# Patient Record
Sex: FEMALE | Race: WHITE | Employment: FULL TIME | URBAN - METROPOLITAN AREA
[De-identification: names, ages, dates, MRNs, and addresses within clinical notes are randomized per-mention and may not be internally consistent; named-entity substitution may affect disease eponyms.]

---

## 2017-03-22 ENCOUNTER — HOSPITAL ENCOUNTER (OUTPATIENT)
Age: 42
Discharge: HOME | End: 2017-03-22
Attending: EMERGENCY MEDICINE
Payer: COMMERCIAL

## 2017-03-22 VITALS
HEART RATE: 74 BPM | SYSTOLIC BLOOD PRESSURE: 143 MMHG | OXYGEN SATURATION: 100 % | WEIGHT: 135 LBS | TEMPERATURE: 98 F | BODY MASS INDEX: 22 KG/M2 | RESPIRATION RATE: 18 BRPM | DIASTOLIC BLOOD PRESSURE: 94 MMHG

## 2017-03-22 DIAGNOSIS — H92.01 OTALGIA, RIGHT: Primary | ICD-10-CM

## 2017-03-22 PROCEDURE — 99213 OFFICE O/P EST LOW 20 MIN: CPT

## 2017-03-22 PROCEDURE — 99214 OFFICE O/P EST MOD 30 MIN: CPT

## 2017-03-22 RX ORDER — AMOXICILLIN 500 MG/1
500 TABLET, FILM COATED ORAL 3 TIMES DAILY
Qty: 15 TABLET | Refills: 0 | Status: SHIPPED | OUTPATIENT
Start: 2017-03-22 | End: 2017-03-27

## 2017-03-22 NOTE — ED PROVIDER NOTES
Patient Seen in: [facility] In [address] [place] [address]    History   Patient presents with:  Ear Problem Pain (neurosensory)    Stated Complaint: rt ear pain    HPI    82-year-old female presents for evaluation of right ear pain.   Patient reports f OR,  1 QD   Fexofenadine-Pseudoephed ER (ALLEGRA-D 12 HOUR)  MG Oral Tablet 12 Hr,  Take 1 tablet by mouth 2 (two) times daily.        Family History   Problem Relation Age of Onset   • Allergies Neg    • Lipids Paternal Grandmother      hyperlipidemi Effort normal and breath sounds normal. No respiratory distress. Abdominal: Bowel sounds are normal.   Musculoskeletal: Normal range of motion. Neurological: She is alert and oriented to person, place, and time.    No focal deficit   Skin: Skin is warm

## 2018-10-13 ENCOUNTER — HOSPITAL ENCOUNTER (OUTPATIENT)
Dept: MAMMOGRAPHY | Facility: HOSPITAL | Age: 43
Discharge: HOME | End: 2018-10-13
Attending: OBSTETRICS & GYNECOLOGY
Payer: COMMERCIAL

## 2018-10-13 DIAGNOSIS — Z12.31 ENCOUNTER FOR SCREENING MAMMOGRAM FOR MALIGNANT NEOPLASM OF BREAST: ICD-10-CM

## 2018-10-13 PROCEDURE — 77063 BREAST TOMOSYNTHESIS BI: CPT | Performed by: OBSTETRICS & GYNECOLOGY

## 2018-10-13 PROCEDURE — 77067 SCR MAMMO BI INCL CAD: CPT | Performed by: OBSTETRICS & GYNECOLOGY

## 2018-11-15 ENCOUNTER — HOSPITAL ENCOUNTER (OUTPATIENT)
Dept: ULTRASOUND IMAGING | Facility: HOSPITAL | Age: 43
Discharge: HOME | End: 2018-11-15
Attending: OBSTETRICS & GYNECOLOGY
Payer: COMMERCIAL

## 2018-11-15 ENCOUNTER — HOSPITAL ENCOUNTER (OUTPATIENT)
Dept: MAMMOGRAPHY | Facility: HOSPITAL | Age: 43
Discharge: HOME | End: 2018-11-15
Attending: OBSTETRICS & GYNECOLOGY
Payer: COMMERCIAL

## 2018-11-15 DIAGNOSIS — R92.8 ABNORMAL MAMMOGRAM: ICD-10-CM

## 2018-11-15 PROCEDURE — 77065 DX MAMMO INCL CAD UNI: CPT | Performed by: OBSTETRICS & GYNECOLOGY

## 2018-11-15 PROCEDURE — 77061 BREAST TOMOSYNTHESIS UNI: CPT | Performed by: OBSTETRICS & GYNECOLOGY

## 2018-11-15 PROCEDURE — 76642 ULTRASOUND BREAST LIMITED: CPT | Performed by: OBSTETRICS & GYNECOLOGY

## 2018-11-16 ENCOUNTER — TELEPHONE (OUTPATIENT)
Dept: HEMATOLOGY/ONCOLOGY | Facility: HOSPITAL | Age: 43
End: 2018-11-16

## 2018-12-06 ENCOUNTER — TELEPHONE (OUTPATIENT)
Dept: HEMATOLOGY/ONCOLOGY | Facility: HOSPITAL | Age: 43
End: 2018-12-06

## 2018-12-06 NOTE — TELEPHONE ENCOUNTER
Called pt to schedule genetic consult, Anne David folder in basket in the call center.  ASKED TO CALL BACK THIS WILL BE THE LAST ATTEMPT TO REACH HER

## 2019-02-27 ENCOUNTER — TELEPHONE (OUTPATIENT)
Dept: HEMATOLOGY/ONCOLOGY | Facility: HOSPITAL | Age: 44
End: 2019-02-27

## 2019-02-27 NOTE — TELEPHONE ENCOUNTER
We have made several attempts to reach patient to make appt with genetic counselor Vishal Baker since 11/16/18. We will no longer be reaching out to the patient.  brandon

## 2020-02-10 ENCOUNTER — OFFICE VISIT (OUTPATIENT)
Dept: FAMILY MEDICINE CLINIC | Facility: CLINIC | Age: 45
End: 2020-02-10
Payer: COMMERCIAL

## 2020-02-10 VITALS
OXYGEN SATURATION: 98 % | TEMPERATURE: 98 F | WEIGHT: 140 LBS | HEART RATE: 68 BPM | HEIGHT: 66 IN | DIASTOLIC BLOOD PRESSURE: 78 MMHG | BODY MASS INDEX: 22.5 KG/M2 | SYSTOLIC BLOOD PRESSURE: 124 MMHG | RESPIRATION RATE: 16 BRPM

## 2020-02-10 DIAGNOSIS — H10.9 CONJUNCTIVITIS OF LEFT EYE, UNSPECIFIED CONJUNCTIVITIS TYPE: Primary | ICD-10-CM

## 2020-02-10 PROCEDURE — 99202 OFFICE O/P NEW SF 15 MIN: CPT | Performed by: PHYSICIAN ASSISTANT

## 2020-02-10 RX ORDER — POLYMYXIN B SULFATE AND TRIMETHOPRIM 1; 10000 MG/ML; [USP'U]/ML
1 SOLUTION OPHTHALMIC EVERY 4 HOURS
Qty: 1 BOTTLE | Refills: 0 | Status: SHIPPED | OUTPATIENT
Start: 2020-02-10 | End: 2020-02-17

## 2020-02-10 NOTE — PROGRESS NOTES
CHIEF COMPLAINT:   Patient presents with:  Eye Problem: swelling around left eye, was not able to open eye x 1 dy       HPI:   Caitlyn Cornejo is a 45year old female who presents for complaints of \"pink eye\" and mild swelling around left eye since last Family History   Problem Relation Age of Onset   • Lipids Paternal Grandmother         hyperlipidemia   • Cancer Paternal Grandmother 50        breast   • Breast Cancer Paternal Grandmother 50   • Lung Disorder Mother         sarcoidosis   • Lung Disorde the past 24 hour(s)). ASSESSMENT:   Douglas Gandhi is a 45year old female who presents with Eye Problem (swelling around left eye, was not able to open eye x 1 dy ).  Symptoms are consistent with:    Conjunctivitis of left eye, unspecified conjunctivit

## 2020-03-18 ENCOUNTER — OFFICE VISIT (OUTPATIENT)
Dept: FAMILY MEDICINE CLINIC | Facility: CLINIC | Age: 45
End: 2020-03-18
Payer: COMMERCIAL

## 2020-03-18 VITALS
DIASTOLIC BLOOD PRESSURE: 72 MMHG | TEMPERATURE: 98 F | HEART RATE: 91 BPM | OXYGEN SATURATION: 98 % | HEIGHT: 66 IN | SYSTOLIC BLOOD PRESSURE: 122 MMHG | BODY MASS INDEX: 22.5 KG/M2 | WEIGHT: 140 LBS

## 2020-03-18 DIAGNOSIS — R09.81 HEAD CONGESTION: Primary | ICD-10-CM

## 2020-03-18 PROCEDURE — 99213 OFFICE O/P EST LOW 20 MIN: CPT | Performed by: NURSE PRACTITIONER

## 2020-03-18 NOTE — PROGRESS NOTES
CHIEF COMPLAINT:   Patient presents with:  Nose Problem: runny nose, nose congestion x 4 dys       HPI:   Pushpa Torres is a 39year old female who presents for upper respiratory symptoms for 4 days.  Patient reports post nasal drip, sinus fullness f drink(s) per week      Comment: occasionally    Drug use: No        REVIEW OF SYSTEMS:   GENERAL: fair appetite.  No fever/chills/bodyaches  SKIN: no rashes or abnormal skin lesions  HEENT: See HPI  LUNGS: denies shortness of breath or wheezing, See HPI  CA

## 2020-03-18 NOTE — PATIENT INSTRUCTIONS
Comfort measures:  · Hydrate! (cold or hot based on comfort). Drink lots of water or other non dehydrating liquids to help with illness.    · Hand washing-use hand  or wash hands frequently, cover your cough or sneeze, do not share towels or drinks

## 2020-07-08 ENCOUNTER — HOSPITAL ENCOUNTER (OUTPATIENT)
Dept: MAMMOGRAPHY | Facility: HOSPITAL | Age: 45
Discharge: HOME | End: 2020-07-08
Attending: OBSTETRICS & GYNECOLOGY
Payer: COMMERCIAL

## 2020-07-08 DIAGNOSIS — Z12.31 SCREENING MAMMOGRAM FOR HIGH-RISK PATIENT: ICD-10-CM

## 2020-07-08 PROCEDURE — 77067 SCR MAMMO BI INCL CAD: CPT | Performed by: OBSTETRICS & GYNECOLOGY

## 2020-07-08 PROCEDURE — 77063 BREAST TOMOSYNTHESIS BI: CPT | Performed by: OBSTETRICS & GYNECOLOGY

## 2020-10-22 ENCOUNTER — APPOINTMENT (OUTPATIENT)
Dept: GENERAL RADIOLOGY | Facility: HOSPITAL | Age: 45
End: 2020-10-22
Attending: EMERGENCY MEDICINE
Payer: COMMERCIAL

## 2020-10-22 ENCOUNTER — HOSPITAL ENCOUNTER (EMERGENCY)
Facility: HOSPITAL | Age: 45
Discharge: HOME | End: 2020-10-22
Attending: EMERGENCY MEDICINE
Payer: COMMERCIAL

## 2020-10-22 VITALS
RESPIRATION RATE: 15 BRPM | TEMPERATURE: 98 F | DIASTOLIC BLOOD PRESSURE: 82 MMHG | HEART RATE: 69 BPM | OXYGEN SATURATION: 100 % | BODY MASS INDEX: 23 KG/M2 | SYSTOLIC BLOOD PRESSURE: 143 MMHG | WEIGHT: 140 LBS

## 2020-10-22 DIAGNOSIS — R09.1 PLEURISY: Primary | ICD-10-CM

## 2020-10-22 PROCEDURE — 80048 BASIC METABOLIC PNL TOTAL CA: CPT | Performed by: EMERGENCY MEDICINE

## 2020-10-22 PROCEDURE — 85025 COMPLETE CBC W/AUTO DIFF WBC: CPT | Performed by: EMERGENCY MEDICINE

## 2020-10-22 PROCEDURE — 81025 URINE PREGNANCY TEST: CPT

## 2020-10-22 PROCEDURE — 71045 X-RAY EXAM CHEST 1 VIEW: CPT | Performed by: EMERGENCY MEDICINE

## 2020-10-22 PROCEDURE — 93005 ELECTROCARDIOGRAM TRACING: CPT

## 2020-10-22 PROCEDURE — 84484 ASSAY OF TROPONIN QUANT: CPT | Performed by: EMERGENCY MEDICINE

## 2020-10-22 PROCEDURE — 99284 EMERGENCY DEPT VISIT MOD MDM: CPT

## 2020-10-22 PROCEDURE — 85379 FIBRIN DEGRADATION QUANT: CPT | Performed by: EMERGENCY MEDICINE

## 2020-10-22 PROCEDURE — 93010 ELECTROCARDIOGRAM REPORT: CPT | Performed by: EMERGENCY MEDICINE

## 2020-10-22 PROCEDURE — 36415 COLL VENOUS BLD VENIPUNCTURE: CPT

## 2020-10-22 NOTE — ED PROVIDER NOTES
Patient Seen in: [facility] AND [facility] Emergency Department    History   Patient presents with:  Pain    Stated Complaint: Pain by L Rib Radiates to L shoulder and L Jaw    HPI    39year old female presenting with chest pain. Pain began yesterday .  The araceli • Cancer Paternal Grandmother 50        breast   • Breast Cancer Paternal Grandmother 50   • Lung Disorder Mother         sarcoidosis   • Lung Disorder Maternal Uncle 67        sarcoidosis (cause of deatg)   • Allergies Neg    • Hypertension Neg sensory function, no focal deficits noted.   Psych: Calm, cooperative, nl affect    Differential diagnosis to include Acute coronary syndrome vs. Acute pulmonary process including pneumothorax vs. Dissection vs.  pleurisy vs. PE vs. Pneumonia/effusion vs. G admit  SCORE 7-10:72.7% MACE over next 6 weeks consider early invasive strategy. Patient has a HEART score of 1, plan will be outpt fu. Six AJ, Tio BE, Wild DAN. Chest pain in the emergency room: value of the HEART score.  Neth Heart J. 2008 Nate Dye

## 2020-10-22 NOTE — ED INITIAL ASSESSMENT (HPI)
Patient presents with constant left rib pain radiating to left shoulder x 24 hours  Noted pain to left jaw today. Denies SOB/chest pain. Denies any injury/increase work outs. Pain began after eating yesterday.

## 2020-10-22 NOTE — ED NOTES
PT safe to DC home per MD. Kenney Lazo to dress self. DC teaching done, pt verbalizes understanding. Ambulatory with steady gait to exit.

## 2021-02-01 ENCOUNTER — IMMUNIZATION (OUTPATIENT)
Dept: LAB | Facility: HOSPITAL | Age: 46
End: 2021-02-01
Attending: EMERGENCY MEDICINE
Payer: COMMERCIAL

## 2021-02-01 DIAGNOSIS — Z23 NEED FOR VACCINATION: Primary | ICD-10-CM

## 2021-02-01 PROCEDURE — 0011A SARSCOV2 VAC 100MCG/0.5ML IM: CPT

## 2021-03-01 ENCOUNTER — IMMUNIZATION (OUTPATIENT)
Dept: LAB | Facility: HOSPITAL | Age: 46
End: 2021-03-01
Attending: EMERGENCY MEDICINE
Payer: COMMERCIAL

## 2021-03-01 DIAGNOSIS — Z23 NEED FOR VACCINATION: Primary | ICD-10-CM

## 2021-03-01 PROCEDURE — 0012A SARSCOV2 VAC 100MCG/0.5ML IM: CPT

## 2021-04-04 ENCOUNTER — OFFICE VISIT (OUTPATIENT)
Dept: FAMILY MEDICINE CLINIC | Facility: CLINIC | Age: 46
End: 2021-04-04
Payer: COMMERCIAL

## 2021-04-04 VITALS
OXYGEN SATURATION: 99 % | TEMPERATURE: 97 F | RESPIRATION RATE: 16 BRPM | BODY MASS INDEX: 22.5 KG/M2 | DIASTOLIC BLOOD PRESSURE: 90 MMHG | HEART RATE: 88 BPM | HEIGHT: 66 IN | SYSTOLIC BLOOD PRESSURE: 136 MMHG | WEIGHT: 140 LBS

## 2021-04-04 DIAGNOSIS — J02.9 SORE THROAT: ICD-10-CM

## 2021-04-04 DIAGNOSIS — J30.2 SEASONAL ALLERGIC RHINITIS, UNSPECIFIED TRIGGER: Primary | ICD-10-CM

## 2021-04-04 PROCEDURE — 99213 OFFICE O/P EST LOW 20 MIN: CPT | Performed by: NURSE PRACTITIONER

## 2021-04-04 PROCEDURE — 3008F BODY MASS INDEX DOCD: CPT | Performed by: NURSE PRACTITIONER

## 2021-04-04 PROCEDURE — 3080F DIAST BP >= 90 MM HG: CPT | Performed by: NURSE PRACTITIONER

## 2021-04-04 PROCEDURE — 87880 STREP A ASSAY W/OPTIC: CPT | Performed by: NURSE PRACTITIONER

## 2021-04-04 PROCEDURE — 3075F SYST BP GE 130 - 139MM HG: CPT | Performed by: NURSE PRACTITIONER

## 2021-04-04 NOTE — PROGRESS NOTES
CHIEF COMPLAINT:   Patient presents with:  Sore Throat        HPI:   Pushpa Torres is a 42year old female presents to clinic with complaint of mild sore throat. Patient has had for  days.   Denies chills,  fever,  headache, body aches, loss of tas otherwise, good appetite  SKIN: denies any unusual skin lesions or rashes  HEENT: See HPI. GI: see HPI. EXAM:   /90   Pulse 88   Temp 97.1 °F (36.2 °C)   Resp 16   Ht 5' 6\" (1.676 m)   Wt 140 lb (63.5 kg)   LMP 03/21/2021   SpO2 99%   BMI 22. Deanna YOUNG Follow up in 3 days if not improving, condition worsens  Verbalized understanding of these issues and agrees to the plan.     Patient Instructions     Self-Care for Sore Throats     Sore throats happen for many reasons, such as colds, allergies Prevention tips include:  · Stop smoking or reduce contact with secondhand smoke. Smoke irritates the tender throat lining. · Limit contact with pets and with allergy-causing substances, such as pollen and mold.   · Wash your hands often when you’re around condition called asthma.    Tests can be done to see what allergens are affecting you. You may be referred to an allergy specialist for testing and further evaluation.    Home care  Your healthcare provider may prescribe medicines to help relieve allergy sy your healthcare provider  · Raised red bumps (hives)  · Continuing symptoms, new symptoms, or worsening symptoms  Call 911  Call 911 if you have:   · Trouble breathing  · Severe swelling of the face or severe itching of the eyes or mouth  · Wheezing or jaqueline

## 2021-04-04 NOTE — PATIENT INSTRUCTIONS
Self-Care for Sore Throats     Sore throats happen for many reasons, such as colds, allergies, cigarette smoke, air pollution, and infections caused by viruses or bacteria. In any case, your throat becomes red and sore.  Your goal for self-care is to redu allergy-causing substances, such as pollen and mold. · Wash your hands often when you’re around someone with a sore throat or cold. This will keep viruses or bacteria from spreading. · Limit outdoor time when air pollution is bad.   · Don’t strain your vo evaluation. Home care  Your healthcare provider may prescribe medicines to help relieve allergy symptoms. These may include oral medicines, nasal sprays, or eye drops.    Ask your provider for advice on how to stay away from substances that you are allerg Trouble breathing  · Severe swelling of the face or severe itching of the eyes or mouth  · Wheezing or shortness of breath  · Chest tightness  · Dizziness or lightheadedness  · Feeling of doom  · Stomach pain, bloating, vomiting, or diarrhea  [employer] last
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
## 2021-12-19 ENCOUNTER — HOSPITAL ENCOUNTER (OUTPATIENT)
Dept: MAMMOGRAPHY | Facility: HOSPITAL | Age: 46
Discharge: HOME | End: 2021-12-19
Attending: OBSTETRICS & GYNECOLOGY
Payer: COMMERCIAL

## 2021-12-19 DIAGNOSIS — Z12.31 ENCOUNTER FOR SCREENING MAMMOGRAM FOR MALIGNANT NEOPLASM OF BREAST: ICD-10-CM

## 2021-12-19 PROCEDURE — 77067 SCR MAMMO BI INCL CAD: CPT | Performed by: OBSTETRICS & GYNECOLOGY

## 2021-12-19 PROCEDURE — 77063 BREAST TOMOSYNTHESIS BI: CPT | Performed by: OBSTETRICS & GYNECOLOGY

## 2022-01-16 ENCOUNTER — HOSPITAL ENCOUNTER (OUTPATIENT)
Age: 47
Discharge: HOME | End: 2022-01-16
Payer: COMMERCIAL

## 2022-01-16 VITALS
WEIGHT: 140 LBS | BODY MASS INDEX: 22.5 KG/M2 | OXYGEN SATURATION: 99 % | RESPIRATION RATE: 18 BRPM | SYSTOLIC BLOOD PRESSURE: 156 MMHG | HEIGHT: 66 IN | DIASTOLIC BLOOD PRESSURE: 95 MMHG | HEART RATE: 94 BPM | TEMPERATURE: 99 F

## 2022-01-16 DIAGNOSIS — M79.674 PAIN OF TOE OF RIGHT FOOT: Primary | ICD-10-CM

## 2022-01-16 PROCEDURE — 99213 OFFICE O/P EST LOW 20 MIN: CPT | Performed by: PHYSICIAN ASSISTANT

## 2022-01-16 NOTE — ED PROVIDER NOTES
Patient Seen in: [facility]      History   Patient presents with: Toe Pain    Stated Complaint: FOOT ISSUE , BLEEDING SEEMS INFECTED    Subjective:   HPI    62-year-old female presenting for evaluation of right great toe pain.   Patient state note reviewed. Constitutional:       General: She is not in acute distress. HENT:      Head: Normocephalic and atraumatic. Right Ear: External ear normal.      Left Ear: External ear normal.      Nose: Nose normal.      Mouth/Throat:      Mouth:  Mu

## 2022-01-16 NOTE — ED INITIAL ASSESSMENT (HPI)
Pt had a pedicure yesterday. Pt states her cuticle was cut on the right big toe.  +bleeding and redness yesterday. +pain.

## 2022-01-19 ENCOUNTER — HOSPITAL ENCOUNTER (OUTPATIENT)
Age: 47
Discharge: HOME | End: 2022-01-19
Payer: COMMERCIAL

## 2022-01-19 VITALS
SYSTOLIC BLOOD PRESSURE: 163 MMHG | OXYGEN SATURATION: 99 % | RESPIRATION RATE: 18 BRPM | TEMPERATURE: 99 F | HEART RATE: 79 BPM | DIASTOLIC BLOOD PRESSURE: 102 MMHG

## 2022-01-19 DIAGNOSIS — Z51.89 VISIT FOR WOUND CHECK: Primary | ICD-10-CM

## 2022-01-19 PROCEDURE — 99213 OFFICE O/P EST LOW 20 MIN: CPT | Performed by: EMERGENCY MEDICINE

## 2022-01-19 RX ORDER — CEFADROXIL 500 MG/1
500 CAPSULE ORAL 2 TIMES DAILY
Qty: 14 CAPSULE | Refills: 0 | Status: SHIPPED | OUTPATIENT
Start: 2022-01-22 | End: 2022-01-29

## 2022-01-20 NOTE — ED PROVIDER NOTES
Patient Seen in: [facility]      History   Patient presents with:   Toe Pain    Stated Complaint: recheck on toe    Subjective:   HPI  Teddy Hinton is a 42year old female here  for wound check to right toe after getting a pedicure las (Temporal)   Resp 18   LMP 01/19/2022   SpO2 99%         Physical Exam  Vitals and nursing note reviewed. Constitutional:       Appearance: Normal appearance. HENT:      Head: Normocephalic.       Nose: Nose normal.   Eyes:      Pupils: Pupils are equal for home. Disposition and Plan     Clinical Impression:  Visit for wound check  (primary encounter diagnosis)     Disposition:  Discharge  1/19/2022  8:24 am    Follow-up:  No follow-up provider specified.         Medications Prescribed:  Discharge

## 2022-06-10 ENCOUNTER — HOSPITAL ENCOUNTER (OUTPATIENT)
Age: 47
Discharge: HOME | End: 2022-06-10
Payer: COMMERCIAL

## 2022-06-10 VITALS
BODY MASS INDEX: 21.97 KG/M2 | OXYGEN SATURATION: 100 % | RESPIRATION RATE: 18 BRPM | HEART RATE: 84 BPM | TEMPERATURE: 98 F | SYSTOLIC BLOOD PRESSURE: 141 MMHG | HEIGHT: 67 IN | DIASTOLIC BLOOD PRESSURE: 88 MMHG | WEIGHT: 140 LBS

## 2022-06-10 DIAGNOSIS — S05.02XA ABRASION OF LEFT CORNEA, INITIAL ENCOUNTER: Primary | ICD-10-CM

## 2022-06-10 PROCEDURE — 99213 OFFICE O/P EST LOW 20 MIN: CPT | Performed by: NURSE PRACTITIONER

## 2022-06-10 RX ORDER — TETRACAINE HYDROCHLORIDE 5 MG/ML
2 SOLUTION OPHTHALMIC ONCE
Status: COMPLETED | OUTPATIENT
Start: 2022-06-10 | End: 2022-06-10

## 2022-06-10 RX ORDER — OFLOXACIN 3 MG/ML
2 SOLUTION/ DROPS OPHTHALMIC 4 TIMES DAILY
Qty: 10 ML | Refills: 0 | Status: SHIPPED | OUTPATIENT
Start: 2022-06-10 | End: 2022-06-15

## 2022-06-10 NOTE — ED INITIAL ASSESSMENT (HPI)
Pt here for evaluation of L eye swelling, redness/tearing/irritated and feeling like there is something there. Pt states this started this AM and pt had a lash lift and tint yesterday.

## 2022-06-11 ENCOUNTER — HOSPITAL ENCOUNTER (EMERGENCY)
Facility: HOSPITAL | Age: 47
Discharge: HOME | End: 2022-06-11
Attending: EMERGENCY MEDICINE
Payer: COMMERCIAL

## 2022-06-11 VITALS
TEMPERATURE: 98 F | HEART RATE: 75 BPM | SYSTOLIC BLOOD PRESSURE: 173 MMHG | OXYGEN SATURATION: 100 % | WEIGHT: 140 LBS | DIASTOLIC BLOOD PRESSURE: 86 MMHG | BODY MASS INDEX: 22 KG/M2 | RESPIRATION RATE: 18 BRPM

## 2022-06-11 DIAGNOSIS — H10.13 ALLERGIC CONJUNCTIVITIS OF BOTH EYES: Primary | ICD-10-CM

## 2022-06-11 PROCEDURE — 99283 EMERGENCY DEPT VISIT LOW MDM: CPT

## 2022-06-11 RX ORDER — TETRACAINE HYDROCHLORIDE 5 MG/ML
1 SOLUTION OPHTHALMIC ONCE
Status: COMPLETED | OUTPATIENT
Start: 2022-06-11 | End: 2022-06-11

## 2022-06-11 RX ORDER — OLOPATADINE HYDROCHLORIDE 1 MG/ML
1 SOLUTION/ DROPS OPHTHALMIC 2 TIMES DAILY
Qty: 1 EACH | Refills: 0 | Status: SHIPPED | OUTPATIENT
Start: 2022-06-11 | End: 2022-06-21

## 2022-06-11 RX ORDER — TETRACAINE HYDROCHLORIDE 5 MG/ML
SOLUTION OPHTHALMIC
Status: COMPLETED
Start: 2022-06-11 | End: 2022-06-11

## 2022-06-11 NOTE — ED QUICK NOTES
Patient A&OX4, discharge paperwork, prescription, and follow-up discussed with pt, pt verbally understands them. Pt discharged ambulatory with steady - no distress noted.

## 2022-06-11 NOTE — ED INITIAL ASSESSMENT (HPI)
Patient here for corneal abrasion that was diagnosed at the  yesterday. Patient was told to come to ER if symptoms worsened. Patients left eye is swollen, red and draining.  Right eye is also starting to swelling and drain

## 2022-08-08 ENCOUNTER — APPOINTMENT (OUTPATIENT)
Dept: URBAN - METROPOLITAN AREA CLINIC 321 | Age: 47
Setting detail: DERMATOLOGY
End: 2022-08-09

## 2022-08-08 DIAGNOSIS — L259 CONTACT DERMATITIS AND OTHER ECZEMA, UNSPECIFIED CAUSE: ICD-10-CM

## 2022-08-08 PROBLEM — L23.9 ALLERGIC CONTACT DERMATITIS, UNSPECIFIED CAUSE: Status: ACTIVE | Noted: 2022-08-08

## 2022-08-08 PROCEDURE — OTHER COUNSELING: OTHER

## 2022-08-08 PROCEDURE — OTHER PRESCRIPTION MEDICATION MANAGEMENT: OTHER

## 2022-08-08 PROCEDURE — 99203 OFFICE O/P NEW LOW 30 MIN: CPT

## 2022-08-08 PROCEDURE — OTHER PRESCRIPTION: OTHER

## 2022-08-08 RX ORDER — CLOBETASOL PROPIONATE 0.5 MG/G
CREAM TOPICAL BID
Qty: 45 | Refills: 0 | Status: ERX | COMMUNITY
Start: 2022-08-08

## 2022-08-08 ASSESSMENT — LOCATION ZONE DERM: LOCATION ZONE: FINGER

## 2022-08-08 ASSESSMENT — LOCATION DETAILED DESCRIPTION DERM
LOCATION DETAILED: RIGHT MID DORSAL RING FINGER
LOCATION DETAILED: LEFT MID DORSAL INDEX FINGER
LOCATION DETAILED: RIGHT MID DORSAL SMALL FINGER
LOCATION DETAILED: RIGHT MIDDLE FINGER PROXIMAL INTERPHALANGEAL JOINT
LOCATION DETAILED: RIGHT MID DORSAL INDEX FINGER
LOCATION DETAILED: LEFT MID DORSAL SMALL FINGER
LOCATION DETAILED: LEFT MIDDLE FINGER PROXIMAL INTERPHALANGEAL JOINT
LOCATION DETAILED: LEFT RING FINGER PROXIMAL INTERPHALANGEAL JOINT

## 2022-08-08 ASSESSMENT — LOCATION SIMPLE DESCRIPTION DERM
LOCATION SIMPLE: LEFT SMALL FINGER
LOCATION SIMPLE: RIGHT INDEX FINGER
LOCATION SIMPLE: RIGHT MIDDLE FINGER
LOCATION SIMPLE: RIGHT RING FINGER
LOCATION SIMPLE: LEFT MIDDLE FINGER
LOCATION SIMPLE: RIGHT SMALL FINGER
LOCATION SIMPLE: LEFT INDEX FINGER
LOCATION SIMPLE: LEFT RING FINGER

## 2023-01-12 RX ORDER — NORGESTIMATE AND ETHINYL ESTRADIOL 7DAYSX3 28
1 KIT ORAL DAILY
COMMUNITY
End: 2023-02-21

## 2023-01-12 RX ORDER — LEVOTHYROXINE SODIUM 0.12 MG/1
125 TABLET ORAL DAILY
COMMUNITY

## 2023-02-21 ENCOUNTER — HOSPITAL ENCOUNTER (OUTPATIENT)
Age: 48
Discharge: HOME | End: 2023-02-21
Payer: COMMERCIAL

## 2023-02-21 VITALS
SYSTOLIC BLOOD PRESSURE: 155 MMHG | HEART RATE: 96 BPM | DIASTOLIC BLOOD PRESSURE: 88 MMHG | TEMPERATURE: 98 F | RESPIRATION RATE: 18 BRPM | OXYGEN SATURATION: 99 %

## 2023-02-21 DIAGNOSIS — J02.9 ACUTE VIRAL PHARYNGITIS: Primary | ICD-10-CM

## 2023-02-21 DIAGNOSIS — R51.9 ACUTE NONINTRACTABLE HEADACHE, UNSPECIFIED HEADACHE TYPE: ICD-10-CM

## 2023-02-21 DIAGNOSIS — H92.03 EARACHE SYMPTOMS, BILATERAL: ICD-10-CM

## 2023-02-21 DIAGNOSIS — R03.0 ELEVATED BLOOD PRESSURE READING: ICD-10-CM

## 2023-02-21 LAB — S PYO AG THROAT QL IA.RAPID: NEGATIVE

## 2023-02-21 PROCEDURE — 99213 OFFICE O/P EST LOW 20 MIN: CPT

## 2023-02-21 PROCEDURE — 87651 STREP A DNA AMP PROBE: CPT | Performed by: PHYSICIAN ASSISTANT

## 2023-02-21 RX ORDER — IBUPROFEN 600 MG/1
TABLET ORAL
Qty: 20 TABLET | Refills: 0 | Status: SHIPPED | OUTPATIENT
Start: 2023-02-21

## 2023-02-21 RX ORDER — NORGESTIMATE AND ETHINYL ESTRADIOL 7DAYSX3 28
KIT ORAL
Qty: 84 TABLET | Refills: 0 | Status: SHIPPED | OUTPATIENT
Start: 2023-02-21 | End: 2023-03-28 | Stop reason: CLARIF

## 2023-02-22 NOTE — ED INITIAL ASSESSMENT (HPI)
Onset of sore throat and headache with bilateral ear pressure today. No fever. Works as a teacher. Concerned about strep. No congestion. Declines covid testing.

## 2023-03-02 ENCOUNTER — TELEMEDICINE (OUTPATIENT)
Dept: GASTROENTEROLOGY | Facility: CLINIC | Age: 48
End: 2023-03-02

## 2023-03-02 ENCOUNTER — TELEPHONE (OUTPATIENT)
Dept: GASTROENTEROLOGY | Facility: CLINIC | Age: 48
End: 2023-03-02

## 2023-03-02 DIAGNOSIS — K64.9 HEMORRHOIDS, UNSPECIFIED HEMORRHOID TYPE: ICD-10-CM

## 2023-03-02 DIAGNOSIS — Z12.11 COLON CANCER SCREENING: Primary | ICD-10-CM

## 2023-03-02 PROCEDURE — 99203 OFFICE O/P NEW LOW 30 MIN: CPT | Performed by: NURSE PRACTITIONER

## 2023-03-02 RX ORDER — HYDROCORTISONE 25 MG/G
1 CREAM TOPICAL 2 TIMES DAILY
Qty: 30 G | Refills: 0 | Status: SHIPPED | OUTPATIENT
Start: 2023-03-02 | End: 2023-03-16

## 2023-03-02 RX ORDER — POLYETHYLENE GLYCOL 3350, SODIUM CHLORIDE, SODIUM BICARBONATE, POTASSIUM CHLORIDE 420; 11.2; 5.72; 1.48 G/4L; G/4L; G/4L; G/4L
POWDER, FOR SOLUTION ORAL
Qty: 4000 ML | Refills: 0 | Status: SHIPPED | OUTPATIENT
Start: 2023-03-02

## 2023-03-02 NOTE — PATIENT INSTRUCTIONS
-fibercon or citrucel  -squatty potty  -sitz baths   -anusol twice daily x 2 weeks  -surgical consult    1. Schedule colonoscopy with [facility] w/ Dr. Cady Earl or Dr. Erica Enamorado: crc screening, hemorrhoids]    2.  bowel prep from pharmacy (split trilyte)    3. Continue all medications for procedure    4. Read all bowel prep instructions carefully    5. AVOID seeds, nuts, popcorn, raw fruits and vegetables (cooked is okay) for 2-3 days before procedure    6. You will need to be tested for COVID within 72 hours of your procedure. You will be contacted with instructions on how to do this.       >>>Please note: if you were prescribed Suprep for the bowel prep and it is too expensive or not covered by insurance, it is okay to substitute Trilyte (or any similar generic prep). This can be done by notifying the pharmacy or calling our office.

## 2023-03-02 NOTE — TELEPHONE ENCOUNTER
Schedulin. Schedule colonoscopy with [facility] w/ Dr. Trevor Mancuso or Dr. Susy Bonds: crc screening, hemorrhoids]    2.  bowel prep from pharmacy (split trilyte)    3. Continue all medications for procedure    4. Read all bowel prep instructions carefully    5. AVOID seeds, nuts, popcorn, raw fruits and vegetables (cooked is okay) for 2-3 days before procedure    6. You will need to be tested for COVID within 72 hours of your procedure. You will be contacted with instructions on how to do this.

## 2023-03-04 ENCOUNTER — HOSPITAL ENCOUNTER (OUTPATIENT)
Dept: MAMMOGRAPHY | Facility: HOSPITAL | Age: 48
Discharge: HOME | End: 2023-03-04
Attending: OBSTETRICS & GYNECOLOGY
Payer: COMMERCIAL

## 2023-03-04 ENCOUNTER — EXTERNAL RECORD (OUTPATIENT)
Dept: HEALTH INFORMATION MANAGEMENT | Facility: OTHER | Age: 48
End: 2023-03-04

## 2023-03-04 DIAGNOSIS — Z12.31 ENCOUNTER FOR SCREENING MAMMOGRAM FOR MALIGNANT NEOPLASM OF BREAST: ICD-10-CM

## 2023-03-04 PROCEDURE — 77067 SCR MAMMO BI INCL CAD: CPT | Performed by: OBSTETRICS & GYNECOLOGY

## 2023-03-04 PROCEDURE — 77063 BREAST TOMOSYNTHESIS BI: CPT | Performed by: OBSTETRICS & GYNECOLOGY

## 2023-03-07 ENCOUNTER — TELEPHONE (OUTPATIENT)
Dept: OBGYN | Age: 48
End: 2023-03-07

## 2023-03-07 PROBLEM — R92.1 BREAST CALCIFICATIONS ON MAMMOGRAM: Status: ACTIVE | Noted: 2023-03-07

## 2023-03-15 NOTE — TELEPHONE ENCOUNTER
LMTCB    If patient calls back please [place] [facility]    (I am at ext until 4pm today 3/15/23)

## 2023-03-25 PROBLEM — R92.2 DENSE BREAST: Status: ACTIVE | Noted: 2023-03-25

## 2023-03-25 PROBLEM — I10 HTN (HYPERTENSION): Status: ACTIVE | Noted: 2023-03-25

## 2023-03-25 PROBLEM — E03.9 HYPOTHYROIDISM: Status: ACTIVE | Noted: 2023-03-25

## 2023-03-25 PROBLEM — R92.30 DENSE BREAST: Status: ACTIVE | Noted: 2023-03-25

## 2023-03-28 ENCOUNTER — OFFICE VISIT (OUTPATIENT)
Dept: OBGYN | Age: 48
End: 2023-03-28

## 2023-03-28 VITALS
TEMPERATURE: 97.5 F | HEIGHT: 66 IN | BODY MASS INDEX: 22.32 KG/M2 | WEIGHT: 138.89 LBS | DIASTOLIC BLOOD PRESSURE: 96 MMHG | HEART RATE: 91 BPM | SYSTOLIC BLOOD PRESSURE: 159 MMHG

## 2023-03-28 DIAGNOSIS — Z30.41 ENCOUNTER FOR SURVEILLANCE OF CONTRACEPTIVE PILLS: ICD-10-CM

## 2023-03-28 DIAGNOSIS — Z12.4 SCREENING FOR MALIGNANT NEOPLASM OF THE CERVIX: ICD-10-CM

## 2023-03-28 DIAGNOSIS — R92.2 DENSE BREAST: ICD-10-CM

## 2023-03-28 DIAGNOSIS — R92.30 DENSE BREAST: ICD-10-CM

## 2023-03-28 DIAGNOSIS — Z01.419 ENCOUNTER FOR GYNECOLOGICAL EXAMINATION (GENERAL) (ROUTINE) WITHOUT ABNORMAL FINDINGS: Primary | ICD-10-CM

## 2023-03-28 DIAGNOSIS — R03.0 ELEVATED BLOOD PRESSURE READING WITHOUT DIAGNOSIS OF HYPERTENSION: ICD-10-CM

## 2023-03-28 PROCEDURE — 87624 HPV HI-RISK TYP POOLED RSLT: CPT | Performed by: INTERNAL MEDICINE

## 2023-03-28 PROCEDURE — 88175 CYTOPATH C/V AUTO FLUID REDO: CPT | Performed by: INTERNAL MEDICINE

## 2023-03-28 PROCEDURE — 3077F SYST BP >= 140 MM HG: CPT | Performed by: OBSTETRICS & GYNECOLOGY

## 2023-03-28 PROCEDURE — 99386 PREV VISIT NEW AGE 40-64: CPT | Performed by: OBSTETRICS & GYNECOLOGY

## 2023-03-28 PROCEDURE — 3080F DIAST BP >= 90 MM HG: CPT | Performed by: OBSTETRICS & GYNECOLOGY

## 2023-03-28 RX ORDER — HYDROCORTISONE 25 MG/G
CREAM TOPICAL
COMMUNITY
Start: 2023-03-05

## 2023-03-28 RX ORDER — NORGESTIMATE AND ETHINYL ESTRADIOL 0.25-0.035
1 KIT ORAL DAILY
Qty: 28 TABLET | Refills: 0 | Status: SHIPPED | OUTPATIENT
Start: 2023-03-28

## 2023-03-28 RX ORDER — NORGESTIMATE AND ETHINYL ESTRADIOL 0.25-0.035
KIT ORAL
Qty: 84 TABLET | OUTPATIENT
Start: 2023-03-28

## 2023-03-28 ASSESSMENT — PATIENT HEALTH QUESTIONNAIRE - PHQ9
2. FEELING DOWN, DEPRESSED OR HOPELESS: NOT AT ALL
SUM OF ALL RESPONSES TO PHQ9 QUESTIONS 1 AND 2: 0
2. FEELING DOWN, DEPRESSED OR HOPELESS: NOT AT ALL
SUM OF ALL RESPONSES TO PHQ9 QUESTIONS 1 AND 2: 0
SUM OF ALL RESPONSES TO PHQ9 QUESTIONS 1 AND 2: 0
CLINICAL INTERPRETATION OF PHQ2 SCORE: NO FURTHER SCREENING NEEDED
CLINICAL INTERPRETATION OF PHQ2 SCORE: NO FURTHER SCREENING NEEDED
1. LITTLE INTEREST OR PLEASURE IN DOING THINGS: NOT AT ALL
SUM OF ALL RESPONSES TO PHQ9 QUESTIONS 1 AND 2: 0
1. LITTLE INTEREST OR PLEASURE IN DOING THINGS: NOT AT ALL

## 2023-03-29 ENCOUNTER — HOSPITAL ENCOUNTER (OUTPATIENT)
Dept: MAMMOGRAPHY | Facility: HOSPITAL | Age: 48
Discharge: HOME | End: 2023-03-29
Attending: OBSTETRICS & GYNECOLOGY
Payer: COMMERCIAL

## 2023-03-29 DIAGNOSIS — R92.8 ABNORMAL MAMMOGRAM: ICD-10-CM

## 2023-03-29 PROCEDURE — 77061 BREAST TOMOSYNTHESIS UNI: CPT | Performed by: OBSTETRICS & GYNECOLOGY

## 2023-03-29 PROCEDURE — 77065 DX MAMMO INCL CAD UNI: CPT | Performed by: OBSTETRICS & GYNECOLOGY

## 2023-03-31 LAB
CASE RPRT: NORMAL
CLINICAL INFO: NORMAL
CYTOLOGY CVX/VAG STUDY: NORMAL
HPV16+18+45 E6+E7MRNA CVX NAA+PROBE: NEGATIVE
Lab: NORMAL
PAP EDUCATIONAL NOTE: NORMAL
SPECIMEN ADEQUACY: NORMAL

## 2023-04-10 ENCOUNTER — APPOINTMENT (OUTPATIENT)
Dept: URBAN - METROPOLITAN AREA CLINIC 244 | Age: 48
Setting detail: DERMATOLOGY
End: 2023-04-10

## 2023-04-10 DIAGNOSIS — L30.0 NUMMULAR DERMATITIS: ICD-10-CM

## 2023-04-10 DIAGNOSIS — L21.8 OTHER SEBORRHEIC DERMATITIS: ICD-10-CM

## 2023-04-10 PROCEDURE — 99214 OFFICE O/P EST MOD 30 MIN: CPT

## 2023-04-10 PROCEDURE — OTHER ADDITIONAL NOTES: OTHER

## 2023-04-10 PROCEDURE — OTHER PRESCRIPTION: OTHER

## 2023-04-10 PROCEDURE — OTHER COUNSELING: OTHER

## 2023-04-10 RX ORDER — FLUOCINONIDE 0.5 MG/ML
SOLUTION TOPICAL
Qty: 60 | Refills: 3 | Status: ERX | COMMUNITY
Start: 2023-04-10

## 2023-04-10 RX ORDER — TRIAMCINOLONE ACETONIDE 1 MG/G
OINTMENT TOPICAL
Qty: 80 | Refills: 0 | Status: ERX | COMMUNITY
Start: 2023-04-10

## 2023-04-10 RX ORDER — HYDROCORTISONE 25 MG/G
CREAM TOPICAL
Qty: 30 | Refills: 1 | Status: ERX | COMMUNITY
Start: 2023-04-10

## 2023-04-10 ASSESSMENT — LOCATION SIMPLE DESCRIPTION DERM
LOCATION SIMPLE: RIGHT PRETIBIAL REGION
LOCATION SIMPLE: LEFT FOREARM
LOCATION SIMPLE: LEFT SCALP
LOCATION SIMPLE: RIGHT FOREARM

## 2023-04-10 ASSESSMENT — LOCATION ZONE DERM
LOCATION ZONE: SCALP
LOCATION ZONE: ARM
LOCATION ZONE: LEG

## 2023-04-10 ASSESSMENT — LOCATION DETAILED DESCRIPTION DERM
LOCATION DETAILED: LEFT MEDIAL FRONTAL SCALP
LOCATION DETAILED: LEFT PROXIMAL DORSAL FOREARM
LOCATION DETAILED: RIGHT PROXIMAL DORSAL FOREARM
LOCATION DETAILED: RIGHT PROXIMAL PRETIBIAL REGION

## 2023-04-10 NOTE — PROCEDURE: ADDITIONAL NOTES
Detail Level: Zone
Additional Notes: Pt has some flaking on hairline, pt instructed to use hydrocortisone 2.5% cream BID for up 2 weeks and then discontinue for any flaring.
Render Risk Assessment In Note?: yes
Additional Notes: Pt instructed to use clobetasol 0.05% cream for hands BID for up 2 weeks as instructed by Dr. Gill. Pt to RTC if not improved within 2 weeks.

## 2023-04-18 ENCOUNTER — TELEPHONE (OUTPATIENT)
Dept: OBGYN | Age: 48
End: 2023-04-18

## 2023-04-18 DIAGNOSIS — R92.1 BREAST CALCIFICATIONS ON MAMMOGRAM: Primary | ICD-10-CM

## 2023-10-04 ENCOUNTER — OFFICE VISIT (OUTPATIENT)
Dept: FAMILY MEDICINE CLINIC | Facility: CLINIC | Age: 48
End: 2023-10-04
Payer: COMMERCIAL

## 2023-10-04 VITALS
OXYGEN SATURATION: 100 % | HEIGHT: 67 IN | DIASTOLIC BLOOD PRESSURE: 72 MMHG | SYSTOLIC BLOOD PRESSURE: 136 MMHG | BODY MASS INDEX: 22.6 KG/M2 | HEART RATE: 83 BPM | RESPIRATION RATE: 16 BRPM | TEMPERATURE: 97 F | WEIGHT: 144 LBS

## 2023-10-04 DIAGNOSIS — J01.10 ACUTE FRONTAL SINUSITIS, RECURRENCE NOT SPECIFIED: ICD-10-CM

## 2023-10-04 DIAGNOSIS — J06.9 URI WITH COUGH AND CONGESTION: Primary | ICD-10-CM

## 2023-10-04 PROCEDURE — 99213 OFFICE O/P EST LOW 20 MIN: CPT | Performed by: NURSE PRACTITIONER

## 2023-10-04 PROCEDURE — 3008F BODY MASS INDEX DOCD: CPT | Performed by: NURSE PRACTITIONER

## 2023-10-04 PROCEDURE — 3075F SYST BP GE 130 - 139MM HG: CPT | Performed by: NURSE PRACTITIONER

## 2023-10-04 PROCEDURE — 3078F DIAST BP <80 MM HG: CPT | Performed by: NURSE PRACTITIONER

## 2023-10-04 RX ORDER — AMOXICILLIN AND CLAVULANATE POTASSIUM 875; 125 MG/1; MG/1
1 TABLET, FILM COATED ORAL 2 TIMES DAILY
Qty: 14 TABLET | Refills: 0 | Status: SHIPPED | OUTPATIENT
Start: 2023-10-04 | End: 2023-10-11

## 2023-10-04 NOTE — PATIENT INSTRUCTIONS
Tylenol and Motrin as needed  Rest, push fluids  Mucinex DM over the counter for nasal congestion/cough  START daily antihistamine (Zyrtec, Claritin, Allegra) and choose one of those. Take daily for 1-2 weeks to help with any post nasal drainage/sore throat  START Flonase nasal spray daily. 1 spray in each nostril  IF symptoms persist x 10-14 days without improvement, go ahead and start Augmentin (antibiotic).  If you start the antibiotic, ensure you finish all 7 days

## 2023-10-09 ENCOUNTER — TELEPHONE (OUTPATIENT)
Dept: OBGYN | Age: 48
End: 2023-10-09

## 2023-10-09 ENCOUNTER — HOSPITAL ENCOUNTER (OUTPATIENT)
Dept: MAMMOGRAPHY | Facility: HOSPITAL | Age: 48
Discharge: HOME | End: 2023-10-09
Attending: OBSTETRICS & GYNECOLOGY
Payer: COMMERCIAL

## 2023-10-09 DIAGNOSIS — R92.8 ABNORMAL MAMMOGRAM: ICD-10-CM

## 2023-10-09 PROCEDURE — 77065 DX MAMMO INCL CAD UNI: CPT | Performed by: OBSTETRICS & GYNECOLOGY

## 2023-10-09 PROCEDURE — 77061 BREAST TOMOSYNTHESIS UNI: CPT | Performed by: OBSTETRICS & GYNECOLOGY

## 2023-10-10 NOTE — DISCHARGE INSTRUCTIONS
The Doctor (Radiologist) who performed your procedure was: DR Mima Steinberg an ice pack over the biopsy site on top of your bra or on top of the ACE wrap (never apply ice directly over skin) for 10-15 minutes of every hour until bedtime for your comfort and to decrease bleeding. Keep your sports bra or the ACE wrap (stereotactic and MRI biopsy) in place for 24 hours after your biopsy. This compression decreases bleeding and breast movement for your comfort. Wear a supportive bra for the next couple of days for comfort (sports bra for sleep). Continue to wear, preferably, a sports bra or good supportive bra for 1 week and take off only to shower. No baths or showers during the first 24 hours after biopsy. After this time you may take a shower. It's okay if the strips get wet but do not soak them. NO saunas, hot tubs or swimming until steri-strips fall off (approx. 5 days). This prevents infection and allows time for them to completely close and heal.  DO NOT remove the steri-strips. They will fall off in 5 days. If any type of irritation (redness, itching or blisters) develops in the area around the steri-strips, remove them gently. If the steri-strips do not fall off after 5 days, gently remove them. Keep the area clean and dry. It is normal to have mild discomfort and bruising at the biopsy site. You may take Tylenol as needed for discomfort, as long as you have no allergies to Tylenol. Do not take aspirin, motrin, ibuprofen or any medication containing NSAID (non-steroidal anti-inflammatory drug) product for 48 hours. DO NOT participate in strenuous activity (aerobics, heavy lifting, housework, gardening, etc.) 48 hours after your biopsy to prevent bleeding. You will receive results in 2-3 business days. If you are having an MRI breast biopsy or an Ultrasound guided breast biopsy, you will be billed for the biopsy and unilateral mammogram separately.   If you have any questions about the procedure or your results, please contact the Breast Care Coordinator Nurse at [phone]. Notify your ordering physician or primary physician for increased bleeding, pain or fever over 100. Or contact a Radiology Nurse at [phone] between 8am-4pm (after 4pm, your call will be directed to the [facility]).

## 2023-10-11 ENCOUNTER — HOSPITAL ENCOUNTER (OUTPATIENT)
Dept: MAMMOGRAPHY | Facility: HOSPITAL | Age: 48
Discharge: HOME | End: 2023-10-11
Attending: OBSTETRICS & GYNECOLOGY
Payer: COMMERCIAL

## 2023-10-11 DIAGNOSIS — R92.1 BREAST CALCIFICATION, RIGHT: ICD-10-CM

## 2023-10-11 PROCEDURE — 19081 BX BREAST 1ST LESION STRTCTC: CPT | Performed by: OBSTETRICS & GYNECOLOGY

## 2023-10-11 PROCEDURE — 88305 TISSUE EXAM BY PATHOLOGIST: CPT | Performed by: OBSTETRICS & GYNECOLOGY

## 2023-10-11 PROCEDURE — 88342 IMHCHEM/IMCYTCHM 1ST ANTB: CPT | Performed by: OBSTETRICS & GYNECOLOGY

## 2023-10-11 PROCEDURE — 88341 IMHCHEM/IMCYTCHM EA ADD ANTB: CPT | Performed by: OBSTETRICS & GYNECOLOGY

## 2023-10-11 NOTE — IMAGING NOTE
History taken and as follows: Grouped punctate calcifications in the right breast lower outer quadrant are suspicious. Stereotactic/tomosynthesis guided biopsy is recommended. 6460 Post instructions  Given verbal et written AVS  summary sheet provided to patient. Patient verbalizes understanding and agreement. 0622  Dr Angela Govea  here to explain risk and benefits of procedure. Questions answered by the physcian    [phone]  Pt consented at  [address] in chair  at  [phone] scouts taken    [phone] Time out taken    0846 Chloro prep as skin prep. Lidocaine 1% 10  Milligrams per ml 5 ml given for superficial anesthetic affect     0847 Lidocaine  1% with epinephrine  1:100,000 units for deeper anesthetic affect 15ML given. More images taken after local  was given. Sampling begins at 0849    Sampling complete at  0850 Core tainer taken to be imaged. 0850   BUCKLE clip inserted . Procedure completed . Pressure to site manually by nurse  and by machine compression for 10 minutes. No active bleeding noted. Area cleaned steri strips to site. Ice pack to site . 0853 Formalin added to samples. 5148 Cores taken to pathology by [facility]    0536  mammography department for post clip images. Nipple markers removed by CHRISTINE STAFF. Bra TO BE applied per patient. 6\" ace secured over bra by mammo staff. Then pt to be discharged by [facility] dept.

## 2023-10-13 ENCOUNTER — TELEPHONE (OUTPATIENT)
Dept: OBGYN | Age: 48
End: 2023-10-13

## 2023-10-13 ENCOUNTER — TELEPHONE (OUTPATIENT)
Dept: MAMMOGRAPHY | Facility: HOSPITAL | Age: 48
End: 2023-10-13

## 2023-10-13 NOTE — TELEPHONE ENCOUNTER
Oumou Guevara is s/p biopsy . Phoned and introduced myself as breast coordinator . Reinforced to patient  post biopsy care and instructions . No c/o post procedure today  sore yesterday . 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 Dx explained to pt in detail she was informed are needs to be excised questions answered. I informed her that I had spoken to Bryn Sheehan in [facility] office and she will discuss with Dr Yasmeen Cabrales who they would like her to see for a surgeon. Giorgio Mccollum verbalizes understanding and agreement pt to call surgeon for an appt    1003 am I contacted Dr Pérez Wooten office and I spoke with Mercedes Corona informed me Dr Pérez Wooten is retired . I informed her that a  surgeon name was needed for surgical consult for ADH and flat atypia. Per Jamison Santacruz she will Have Dr Yasmeen Cabrales follow up with pt with name of a surgeon. Informed  and shared the pathology results as well as the recommendations from Dr Tono Moon for her breast imaging  as follows:      Pathology results shared (see epic for dictated pathology and radiology procedure report)  and recommendations are as follows:                 Giorgio Tamayocknowledges the above and denies questions. Oumou Ismael was also instructed to perform breast self exams and if any changes  develops any changes to contact ordering  physician immediately  for re evaluation. Kuldeep Mccollumverbalizes understanding and agreement.

## 2023-10-16 ENCOUNTER — TELEPHONE (OUTPATIENT)
Dept: MAMMOGRAPHY | Facility: HOSPITAL | Age: 48
End: 2023-10-16

## 2023-10-16 NOTE — PROCEDURES
[facility] HOSP - [facility]  Procedure Note    Greg Lujan Patient Status:  Outpatient    1975 MRN [id number]   Location [address] [facility] Attending No att. providers found   Hosp Day # 0 PCP None Pcp     Procedure: stereotactic guided breast biopsy    Pre-Procedure Diagnosis:  right breast LOQ calcifications    Post-Procedure Diagnosis: right breast LOQ calcifications    Anesthesia:  Local    Findings:  right breast LOQ calcifications    Specimens: Trenton Bullard DO  10/16/2023

## 2023-10-16 NOTE — TELEPHONE ENCOUNTER
Called in to inform me a \"doctor from [facility] doesn't  remember name of Md took over for Dr Gabby Rousseau who retired\" called and gave me names of surgeons . Tod Maxwell she had a friend who just had breast cancer and saw Dr Elia Reich and that is who she is going to see. She is going to call today and make an appt to see DR Elia Reich she was just letting me know . I informed her I will send a staff message to her staff informing them that she would like to follow up with her.

## 2023-10-17 ENCOUNTER — TELEPHONE (OUTPATIENT)
Dept: HEMATOLOGY/ONCOLOGY | Facility: HOSPITAL | Age: 48
End: 2023-10-17

## 2023-10-17 NOTE — TELEPHONE ENCOUNTER
Returning patient's call concerning appointment with Dr. Narcisa Colon. Message left for patient asking that she please return call to discuss appointment availability for tomorrow.

## 2023-10-18 ENCOUNTER — OFFICE VISIT (OUTPATIENT)
Dept: SURGERY | Facility: CLINIC | Age: 48
End: 2023-10-18
Payer: COMMERCIAL

## 2023-10-18 VITALS
RESPIRATION RATE: 16 BRPM | TEMPERATURE: 98 F | OXYGEN SATURATION: 99 % | HEART RATE: 84 BPM | DIASTOLIC BLOOD PRESSURE: 100 MMHG | WEIGHT: 143.38 LBS | SYSTOLIC BLOOD PRESSURE: 159 MMHG | BODY MASS INDEX: 22.51 KG/M2 | HEIGHT: 67 IN

## 2023-10-18 DIAGNOSIS — R92.30 DENSE BREAST: ICD-10-CM

## 2023-10-18 DIAGNOSIS — R92.2 DENSE BREAST: ICD-10-CM

## 2023-10-18 DIAGNOSIS — Z91.89 AT HIGH RISK FOR BREAST CANCER: ICD-10-CM

## 2023-10-18 DIAGNOSIS — N60.91 ATYPICAL HYPERPLASIA OF RIGHT BREAST: Primary | ICD-10-CM

## 2023-10-18 RX ORDER — NORGESTIMATE AND ETHINYL ESTRADIOL 7DAYSX3 28
1 KIT ORAL DAILY
COMMUNITY

## 2023-10-19 ENCOUNTER — NURSE ONLY (OUTPATIENT)
Dept: HEMATOLOGY/ONCOLOGY | Facility: HOSPITAL | Age: 48
End: 2023-10-19
Attending: GENETIC COUNSELOR, MS
Payer: COMMERCIAL

## 2023-10-19 ENCOUNTER — GENETICS ENCOUNTER (OUTPATIENT)
Dept: GENETICS | Facility: HOSPITAL | Age: 48
End: 2023-10-19
Attending: GENETIC COUNSELOR, MS
Payer: COMMERCIAL

## 2023-10-19 DIAGNOSIS — N60.91 ATYPICAL HYPERPLASIA OF RIGHT BREAST: Primary | ICD-10-CM

## 2023-10-19 DIAGNOSIS — Z80.3 FAMILY HISTORY OF MALIGNANT NEOPLASM OF BREAST: ICD-10-CM

## 2023-10-19 PROCEDURE — 96040 HC GENETIC COUNSELING EA 30 MIN: CPT | Performed by: GENETIC COUNSELOR, MS

## 2023-10-19 PROCEDURE — 36415 COLL VENOUS BLD VENIPUNCTURE: CPT

## 2023-10-30 ENCOUNTER — TELEPHONE (OUTPATIENT)
Dept: GENETICS | Facility: HOSPITAL | Age: 48
End: 2023-10-30

## 2023-10-30 NOTE — TELEPHONE ENCOUNTER
ENR[place] for Kiley with NEGATIVE genetic testing results. She requested testing for 9 gene breast-focused STAT Panel through [facility] ([facility] Breast Cancer STAT Panel with GLENNA and CHEK2) and all genes yielded negative results. Released results to her through lab's portal and mailed her a copy. Encouraged her to reach out to me with any questions.

## 2023-10-31 ENCOUNTER — HOSPITAL ENCOUNTER (OUTPATIENT)
Dept: MRI IMAGING | Facility: HOSPITAL | Age: 48
Discharge: HOME | End: 2023-10-31
Attending: SURGERY
Payer: COMMERCIAL

## 2023-10-31 DIAGNOSIS — Z91.89 AT HIGH RISK FOR BREAST CANCER: ICD-10-CM

## 2023-10-31 DIAGNOSIS — R92.30 DENSE BREAST: ICD-10-CM

## 2023-10-31 DIAGNOSIS — R92.2 DENSE BREAST: ICD-10-CM

## 2023-10-31 DIAGNOSIS — N60.91 ATYPICAL HYPERPLASIA OF RIGHT BREAST: ICD-10-CM

## 2023-10-31 PROCEDURE — 77049 MRI BREAST C-+ W/CAD BI: CPT | Performed by: SURGERY

## 2023-10-31 PROCEDURE — A9575 INJ GADOTERATE MEGLUMI 0.1ML: HCPCS | Performed by: SURGERY

## 2023-10-31 RX ORDER — GADOTERATE MEGLUMINE 376.9 MG/ML
15 INJECTION INTRAVENOUS
Status: COMPLETED | OUTPATIENT
Start: 2023-10-31 | End: 2023-10-31

## 2023-10-31 RX ADMIN — GADOTERATE MEGLUMINE 15 ML: 376.9 INJECTION INTRAVENOUS at 13:57:00

## 2023-11-02 ENCOUNTER — TELEPHONE (OUTPATIENT)
Dept: SURGERY | Facility: CLINIC | Age: 48
End: 2023-11-02

## 2023-11-02 ENCOUNTER — DOCUMENTATION ONLY (OUTPATIENT)
Dept: SURGERY | Facility: CLINIC | Age: 48
End: 2023-11-02

## 2023-11-02 DIAGNOSIS — N60.91 ATYPICAL HYPERPLASIA OF RIGHT BREAST: Primary | ICD-10-CM

## 2023-11-02 NOTE — PATIENT INSTRUCTIONS
Dr. Valeria Valenzuela  Tel: [phone]  Fax: [phone]  [address] [place], [place] [address]  [phone]     Surgery/Procedure: Right breast 2 site wire localized excisional biopsy     Anesthesia:   MAC  Surgery Length:   45 minutes CPT:  41473, 72443   Wire LOC:   Yes Nuc Med:   No   Myriam Seed:  No       Dx & ICD-10: Atypical hyperplasia of right breast (N60.91). Radiology Instructions: 1st site site: Right breast, lower outer quadrant position, buckle shaped clip, biopsy demonstrates focal atypical ductal hyperplasia and flat epithelial atypia. 2nd site: Right breast, posterior lower outer quadrant position, calcifications not previously biopsied. _______________________________________________________________________________    Someone must accompany you the day of the procedure to drive you home safely, because of anesthesia. You will need an adult  to stay with you the first night following your surgery. You must remove any kind of makeup, acrylic nails, lotions, powders, creams or deodorant. [place] ONLY: Pre-admission will give instruct you on when to take Gatorade and Tylenol/acetaminophen prior to your surgery, purchase 2 - 12oz bottles of regular Gatorade (NOT RED/SUGAR FREE). Otherwise, you may not eat or drink anything else after 11PM the night before surgery. [place] ONLY: You may not eat or drink anything after midnight the day of your surgery. Wear comfortable clothing that can be easily removed. If you wear dentures, contacts lenses, or any prosthesis, you will be asked to remove them. Do not drink alcohol or smoke 24 hours prior to your procedure. Bring a picture ID and your insurance card.   Covid-19 testing is no longer required before surgery unless you are experiencing symptoms such as fever, cough, congestion, etc.   The Pre-Admission Testing Department will call the day before to confirm your procedure, give you the time you need to arrive by and directions on where to go. They begin making calls after 2pm, if you are not contacted by 4pm, please call the surgeon's office listed above. Do not take any blood thinners at least one week prior to the procedure/surgery. This includes aspirin, baby aspirin, Ibuprofen products, herbal supplements, diet medications, vitamin E, fish oil and green tea supplements. Please check other supplements for these ingredients. *TYLENOL or acetaminophen is acceptable*  If you take Coumadin, Plavix, Xarelto, or Eliquis, please contact your prescribing physician for special instructions on how long to hold. If you take insulin contact your primary care physician for special instructions. Our surgery scheduler, Debbie Salazar, will be contacting you to discuss surgery dates. If you have any questions related to scheduling your surgery, please reach out to her at [phone].  _____________________________________________________________________  PRE-OPERATIVE TESTING IF INDICATED BELOW  PLEASE COMPLETE ASAP (AT LEAST 14-21 DAYS PRIOR TO SURGERY)  [] CBC [] BMP [] CMP [] EKG    [] PT, PTT, INR [] Cardiac Clearance  [] H&P Medical Clearance [] Chest X-ray     Please call Central Scheduling to schedule an appointment for pre-operative labs/tests @ [phone]  Does the patient have a pacemaker or ICD? Does the patient have sleep apnea?   [] Yes   [x] No                               [] Yes   [x] No

## 2023-11-02 NOTE — TELEPHONE ENCOUNTER
Calling pt in regards to scheduling surgery. Informed pt that I have 11/27/2023 patient asked for 12/04/2023 available at [facility] with Dr. Stephanie Morris. Pt verbalized understanding and in agreement with date and location. All questions answered. Encouraged pt to call or [employer] office with any other questions or concerns.

## 2023-12-04 ENCOUNTER — APPOINTMENT (OUTPATIENT)
Dept: MAMMOGRAPHY | Facility: HOSPITAL | Age: 48
End: 2023-12-04
Attending: SURGERY
Payer: COMMERCIAL

## 2023-12-04 ENCOUNTER — ANESTHESIA (OUTPATIENT)
Dept: SURGERY | Facility: HOSPITAL | Age: 48
End: 2023-12-04
Payer: COMMERCIAL

## 2023-12-04 ENCOUNTER — HOSPITAL ENCOUNTER (OUTPATIENT)
Facility: HOSPITAL | Age: 48
Setting detail: HOSPITAL OUTPATIENT SURGERY
Discharge: HOME | End: 2023-12-04
Attending: SURGERY | Admitting: SURGERY
Payer: COMMERCIAL

## 2023-12-04 ENCOUNTER — HOSPITAL ENCOUNTER (OUTPATIENT)
Dept: MAMMOGRAPHY | Facility: HOSPITAL | Age: 48
Discharge: HOME | End: 2023-12-04
Attending: SURGERY
Payer: COMMERCIAL

## 2023-12-04 ENCOUNTER — ANESTHESIA EVENT (OUTPATIENT)
Dept: SURGERY | Facility: HOSPITAL | Age: 48
End: 2023-12-04
Payer: COMMERCIAL

## 2023-12-04 VITALS
HEART RATE: 75 BPM | SYSTOLIC BLOOD PRESSURE: 143 MMHG | HEIGHT: 67 IN | TEMPERATURE: 99 F | RESPIRATION RATE: 18 BRPM | OXYGEN SATURATION: 98 % | BODY MASS INDEX: 22.76 KG/M2 | DIASTOLIC BLOOD PRESSURE: 93 MMHG | WEIGHT: 145 LBS

## 2023-12-04 DIAGNOSIS — N60.91 ATYPICAL HYPERPLASIA OF RIGHT BREAST: ICD-10-CM

## 2023-12-04 DIAGNOSIS — N60.91 ATYPICAL HYPERPLASIA OF RIGHT BREAST: Primary | ICD-10-CM

## 2023-12-04 LAB — B-HCG UR QL: NEGATIVE

## 2023-12-04 PROCEDURE — 81025 URINE PREGNANCY TEST: CPT

## 2023-12-04 PROCEDURE — 88342 IMHCHEM/IMCYTCHM 1ST ANTB: CPT | Performed by: SURGERY

## 2023-12-04 PROCEDURE — 19282 PERQ DEVICE BREAST EA IMAG: CPT | Performed by: SURGERY

## 2023-12-04 PROCEDURE — 76098 X-RAY EXAM SURGICAL SPECIMEN: CPT | Performed by: SURGERY

## 2023-12-04 PROCEDURE — 0HBT0ZZ EXCISION OF RIGHT BREAST, OPEN APPROACH: ICD-10-PCS | Performed by: SURGERY

## 2023-12-04 PROCEDURE — 19281 PERQ DEVICE BREAST 1ST IMAG: CPT | Performed by: SURGERY

## 2023-12-04 PROCEDURE — 88300 SURGICAL PATH GROSS: CPT | Performed by: SURGERY

## 2023-12-04 PROCEDURE — 88341 IMHCHEM/IMCYTCHM EA ADD ANTB: CPT | Performed by: SURGERY

## 2023-12-04 PROCEDURE — 88307 TISSUE EXAM BY PATHOLOGIST: CPT | Performed by: SURGERY

## 2023-12-04 RX ORDER — MORPHINE SULFATE 10 MG/ML
6 INJECTION, SOLUTION INTRAMUSCULAR; INTRAVENOUS EVERY 10 MIN PRN
Status: DISCONTINUED | OUTPATIENT
Start: 2023-12-04 | End: 2023-12-04

## 2023-12-04 RX ORDER — ACETAMINOPHEN 500 MG
1000 TABLET ORAL ONCE
Status: COMPLETED | OUTPATIENT
Start: 2023-12-04 | End: 2023-12-04

## 2023-12-04 RX ORDER — MIDAZOLAM HYDROCHLORIDE 1 MG/ML
INJECTION INTRAMUSCULAR; INTRAVENOUS AS NEEDED
Status: DISCONTINUED | OUTPATIENT
Start: 2023-12-04 | End: 2023-12-04 | Stop reason: SURG

## 2023-12-04 RX ORDER — HYDROCODONE BITARTRATE AND ACETAMINOPHEN 5; 325 MG/1; MG/1
1-2 TABLET ORAL EVERY 6 HOURS PRN
Qty: 20 TABLET | Refills: 0 | Status: SHIPPED | OUTPATIENT
Start: 2023-12-04 | End: 2023-12-13 | Stop reason: ALTCHOICE

## 2023-12-04 RX ORDER — PROCHLORPERAZINE EDISYLATE 5 MG/ML
5 INJECTION INTRAMUSCULAR; INTRAVENOUS EVERY 8 HOURS PRN
Status: DISCONTINUED | OUTPATIENT
Start: 2023-12-04 | End: 2023-12-04

## 2023-12-04 RX ORDER — SODIUM CHLORIDE, SODIUM LACTATE, POTASSIUM CHLORIDE, CALCIUM CHLORIDE 600; 310; 30; 20 MG/100ML; MG/100ML; MG/100ML; MG/100ML
INJECTION, SOLUTION INTRAVENOUS CONTINUOUS
Status: DISCONTINUED | OUTPATIENT
Start: 2023-12-04 | End: 2023-12-04

## 2023-12-04 RX ORDER — LIDOCAINE HYDROCHLORIDE AND EPINEPHRINE 10; 10 MG/ML; UG/ML
INJECTION, SOLUTION INFILTRATION; PERINEURAL AS NEEDED
Status: DISCONTINUED | OUTPATIENT
Start: 2023-12-04 | End: 2023-12-04 | Stop reason: HOSPADM

## 2023-12-04 RX ORDER — HYDROMORPHONE HYDROCHLORIDE 1 MG/ML
0.2 INJECTION, SOLUTION INTRAMUSCULAR; INTRAVENOUS; SUBCUTANEOUS EVERY 5 MIN PRN
Status: DISCONTINUED | OUTPATIENT
Start: 2023-12-04 | End: 2023-12-04

## 2023-12-04 RX ORDER — ONDANSETRON 2 MG/ML
INJECTION INTRAMUSCULAR; INTRAVENOUS AS NEEDED
Status: DISCONTINUED | OUTPATIENT
Start: 2023-12-04 | End: 2023-12-04 | Stop reason: SURG

## 2023-12-04 RX ORDER — HYDROMORPHONE HYDROCHLORIDE 1 MG/ML
0.6 INJECTION, SOLUTION INTRAMUSCULAR; INTRAVENOUS; SUBCUTANEOUS EVERY 5 MIN PRN
Status: DISCONTINUED | OUTPATIENT
Start: 2023-12-04 | End: 2023-12-04

## 2023-12-04 RX ORDER — DEXAMETHASONE SODIUM PHOSPHATE 4 MG/ML
VIAL (ML) INJECTION AS NEEDED
Status: DISCONTINUED | OUTPATIENT
Start: 2023-12-04 | End: 2023-12-04 | Stop reason: SURG

## 2023-12-04 RX ORDER — MORPHINE SULFATE 4 MG/ML
4 INJECTION, SOLUTION INTRAMUSCULAR; INTRAVENOUS EVERY 10 MIN PRN
Status: DISCONTINUED | OUTPATIENT
Start: 2023-12-04 | End: 2023-12-04

## 2023-12-04 RX ORDER — BUPIVACAINE HYDROCHLORIDE 5 MG/ML
INJECTION, SOLUTION EPIDURAL; INTRACAUDAL AS NEEDED
Status: DISCONTINUED | OUTPATIENT
Start: 2023-12-04 | End: 2023-12-04 | Stop reason: HOSPADM

## 2023-12-04 RX ORDER — NALOXONE HYDROCHLORIDE 0.4 MG/ML
0.08 INJECTION, SOLUTION INTRAMUSCULAR; INTRAVENOUS; SUBCUTANEOUS AS NEEDED
Status: DISCONTINUED | OUTPATIENT
Start: 2023-12-04 | End: 2023-12-04

## 2023-12-04 RX ORDER — ONDANSETRON 2 MG/ML
4 INJECTION INTRAMUSCULAR; INTRAVENOUS EVERY 6 HOURS PRN
Status: DISCONTINUED | OUTPATIENT
Start: 2023-12-04 | End: 2023-12-04

## 2023-12-04 RX ORDER — HYDROMORPHONE HYDROCHLORIDE 1 MG/ML
0.4 INJECTION, SOLUTION INTRAMUSCULAR; INTRAVENOUS; SUBCUTANEOUS EVERY 5 MIN PRN
Status: DISCONTINUED | OUTPATIENT
Start: 2023-12-04 | End: 2023-12-04

## 2023-12-04 RX ORDER — MORPHINE SULFATE 4 MG/ML
2 INJECTION, SOLUTION INTRAMUSCULAR; INTRAVENOUS EVERY 10 MIN PRN
Status: DISCONTINUED | OUTPATIENT
Start: 2023-12-04 | End: 2023-12-04

## 2023-12-04 RX ORDER — CEFAZOLIN SODIUM/WATER 2 G/20 ML
2 SYRINGE (ML) INTRAVENOUS ONCE
Status: COMPLETED | OUTPATIENT
Start: 2023-12-04 | End: 2023-12-04

## 2023-12-04 RX ADMIN — DEXAMETHASONE SODIUM PHOSPHATE 4 MG: 4 MG/ML VIAL (ML) INJECTION at 10:50:00

## 2023-12-04 RX ADMIN — MIDAZOLAM HYDROCHLORIDE 2 MG: 1 INJECTION INTRAMUSCULAR; INTRAVENOUS at 10:39:00

## 2023-12-04 RX ADMIN — ONDANSETRON 4 MG: 2 INJECTION INTRAMUSCULAR; INTRAVENOUS at 10:50:00

## 2023-12-04 RX ADMIN — CEFAZOLIN SODIUM/WATER 2 G: 2 G/20 ML SYRINGE (ML) INTRAVENOUS at 10:46:00

## 2023-12-04 NOTE — BRIEF OP NOTE
Pre-Operative Diagnosis: Atypical hyperplasia of right breast [N60.91]     Post-Operative Diagnosis: Atypical hyperplasia of right breast [N60.91]      Procedure Performed:   Right breast 2 site wire localized excisional biopsy    Surgeon(s) and Role:     Suresh Garces MD - Primary    Assistant(s):  Surgical Assistant.: Nila Negro CSA     Surgical Findings: Clip noted in anterior lumpectomy, calcifications noted in posterior lumpectomy     Specimen: Anterior and posterior lumpectomies     Estimated Blood Loss: 5cc  Lajuan Brunner, MD  12/4/2023  11:47 AM

## 2023-12-04 NOTE — IMAGING NOTE
0801 Pt  to mammography department in wheelchair with transporter escort. Hx taken, procedure explained, questions answered. IV patent, no redness or swelling noted at site    0803 Consent signed and verified     Rebecca Spivey here scanning completed Order verified and signed by all  procedural staff members    0809 Time out taken     Site marked RIGHT Breast    Scouts completed by [facility] mammography technologist     8459 Chloro prep as skin prep to sites. Lidocaine 1% 10 milligrams per ml given as anesthetic affect from kit. 5670 Ghiatas needle 20 gauge  X 5 cm placed POSTERIOR LOWER OUTER SITE      0818 Lidocaine 1% 10 milligrams per ml given as anesthetic affect from kit. 0820 Ghiatas needle 20 gauge  X 5 cm placed LOWER OUTER BUCCAL SITE    5180 pt re- imaged, procedure complete. 1137 BB marker to site wire secured to breast  strips. A 4x4 dsg secured  over wire with tape by nurse after images completed . IV patent, no redness or swelling noted at site. Pt up to bathroom, feels well    0857 Transporter arrived to return pt to her room.

## 2023-12-04 NOTE — PROCEDURES
[facility]  Procedure Note    Charito Connor Patient Status:  Outpatient    1975 MRN [id number]   Location [address] [facility] Attending Naz Peters MD   Hosp Day # 0 PCP None Pcp     Procedure: right breast localizations    Pre-Procedure Diagnosis:  fADH, calcifications    Post-Procedure Diagnosis: same    Anesthesia:  Local    Findings:  biopsy clip, calcifications    Specimens: 0    Blood Loss:  0    Tourniquet Time: 0  Complications:  None  Drains:  0    Secondary Diagnosis: none    Madonna Perry MD  2023

## 2023-12-05 NOTE — OPERATIVE REPORT
[facility]    PATIENT'S NAME: Guerda Charlton   ATTENDING PHYSICIAN: Andrey Lin. Reilly Aranda MD   OPERATING PHYSICIAN: Andrey Lin. Reilly Aranda MD   PATIENT ACCOUNT#:   [id number]    LOCATION:  [address]  MEDICAL RECORD #:   [id number]       YOB: 1975  ADMISSION DATE:       12/04/2023      OPERATION DATE:  12/04/2023    OPERATIVE REPORT    PREOPERATIVE DIAGNOSIS:  Atypical hyperplasia of right breast and right breast calcifications. POSTOPERATIVE DIAGNOSIS:  Atypical hyperplasia of right breast and right breast calcifications. PROCEDURE:  Right breast 2-site wire-localized lumpectomy with 2-site specimen radiograph. ASSISTANT:  Karoline Sher CSA. ANESTHESIA:  Monitored anesthesia care and local.    ESTIMATED BLOOD LOSS:  5 mL. DRAINS:  None. COMPLICATIONS:  None. DISPOSITION:  Stable on transfer to the recovery room. INDICATIONS:  The patient is a 47-year-old female presenting with imaging-detected concern of the right breast.  She was found on biopsy to have an area of atypical ductal hyperplasia. She had an additional area of calcification seen in the posterolateral right breast for which surveillance was originally recommended. We discussed local treatment options, and I recommended formal surgical excision of the atypia to exclude concerning residual pathology in that location. I also recommended localization and excision of the other area of calcification for pathological confirmation. Risks and possible complications, therefore, of a 2-site wire-localized lumpectomy were discussed with the patient including, but not limited to, infection, bleeding, injury to surrounding structures, and possible need for reoperation. She agreed to the proposed surgery.     OPERATIVE TECHNIQUE:  Patient was brought to the OR, placed in supine position, properly padded and secured, given a dose of IV antibiotics, and sequential compression devices were applied to legs for DVT prophylaxis. Monitored anesthesia care was induced. The right breast was prepped and draped in usual sterile fashion. Lidocaine 1% with epinephrine was used to infiltrate the skin and subcutaneous tissue at the targeted incision site. A curvilinear incision was made along the inferior areolar border with a 15-blade knife for the skin. The anterior wire was identified, brought into the field, and using sharp dissection and electrocautery, a segment of breast tissue surrounding the tip of the wire was carefully excised and oriented with a short stitch and single clip superiorly, a long stitch and double clip laterally in order to allow for appropriate pathological margin and specimen review. This was then placed in the imaging device where specimen x-ray confirmed the presence of the targeted buckle clip, residual calcifications, and adequate margins as deemed by myself and sent for routine permanent pathologic evaluation. This wound was irrigated and hemostasis assured with electrocautery. Attention was taken toward the location of the lateral second wire. Lidocaine 1% with epinephrine was used to infiltrate the skin and subcutaneous tissue at the targeted incision site. A curvilinear incision was made along the lateral areolar border and connected to the original incision, and through this incision, I tunneled to find the lateral wire. This was brought into the field, and a segment of breast tissue surrounding the tip of the wire was carefully excised and oriented with a short stitch and single clip superiorly, long stitch and double clip laterally in order to allow for appropriate pathological margin and specimen review. It was then placed in the imaging device where specimen x-ray confirmed the presence of the targeted calcifications with adequate margins as deemed by myself. The wound was irrigated and hemostasis assured with electrocautery.   This wound was closed with interrupted 3-0 Vicryl for deep layer and running 4-0 subcuticular Monocryl for skin. Mastisol and Steri-Strips were applied. Marcaine 0.5% was instilled in the cavity to assist with postoperative analgesia. A sterile dressing and compression bra were placed. Her blood loss was minimal.  All counts were correct at the conclusion of the procedure. She tolerated the procedure well, and she was transferred to the recovery room in stable condition. Dictated By Vesna Simons. Hector Louis MD  d: 12/04/2023 11:36:46  t: 12/04/2023 13:24:31  [facility] [id number]  CMG/    cc: Devyn Bond.  MD Nevin

## 2023-12-13 ENCOUNTER — OFFICE VISIT (OUTPATIENT)
Dept: SURGERY | Facility: CLINIC | Age: 48
End: 2023-12-13
Payer: COMMERCIAL

## 2023-12-13 VITALS
TEMPERATURE: 97 F | RESPIRATION RATE: 18 BRPM | DIASTOLIC BLOOD PRESSURE: 133 MMHG | HEIGHT: 67 IN | SYSTOLIC BLOOD PRESSURE: 180 MMHG | HEART RATE: 96 BPM | BODY MASS INDEX: 22.44 KG/M2 | OXYGEN SATURATION: 97 % | WEIGHT: 143 LBS

## 2023-12-13 DIAGNOSIS — N60.91 ATYPICAL DUCTAL HYPERPLASIA OF RIGHT BREAST: Primary | ICD-10-CM

## 2023-12-13 PROCEDURE — 99024 POSTOP FOLLOW-UP VISIT: CPT | Performed by: SURGERY

## 2023-12-13 PROCEDURE — 3080F DIAST BP >= 90 MM HG: CPT | Performed by: SURGERY

## 2023-12-13 PROCEDURE — 3077F SYST BP >= 140 MM HG: CPT | Performed by: SURGERY

## 2023-12-13 PROCEDURE — 3008F BODY MASS INDEX DOCD: CPT | Performed by: SURGERY

## 2024-02-04 ENCOUNTER — PATIENT MESSAGE (OUTPATIENT)
Dept: GASTROENTEROLOGY | Facility: CLINIC | Age: 49
End: 2024-02-04

## 2024-02-07 NOTE — TELEPHONE ENCOUNTER
From: Kiley Mccollum  To: Randee Valdez  Sent: 2/4/2024 11:43 AM CST  Subject: Colonoscopy    Hello,  Last year you sent me a referral to get a colonoscopy and it expires 3/1. Can you have someone call me to schedule that appointment or direct me on who I should call? I did receive a phone call last year, but unfortunately my mom was hospitalized at that time. I was unable to schedule the appointment.  Thank you,  Kiley Mccollum

## 2024-02-09 ENCOUNTER — TELEPHONE (OUTPATIENT)
Facility: CLINIC | Age: 49
End: 2024-02-09

## 2024-02-09 DIAGNOSIS — K64.9 HEMORRHOIDS, UNSPECIFIED HEMORRHOID TYPE: Primary | ICD-10-CM

## 2024-02-09 DIAGNOSIS — Z12.11 SCREEN FOR COLON CANCER: ICD-10-CM

## 2024-02-09 NOTE — TELEPHONE ENCOUNTER
Also see 2/4/2024 [employer] Message - patient requesting to speak with RN.  Please call after 330pm.  Thank you.

## 2024-02-09 NOTE — TELEPHONE ENCOUNTER
RN called pt x 2, no answer either time. RN LMTCB. GI office number provided. RN needs to go over medical questions/update in order to proceed with scheduling colon.

## 2024-02-29 ENCOUNTER — APPOINTMENT (OUTPATIENT)
Dept: URBAN - METROPOLITAN AREA CLINIC 244 | Age: 49
Setting detail: DERMATOLOGY
End: 2024-03-01

## 2024-02-29 DIAGNOSIS — L92.3 FOREIGN BODY GRANULOMA OF THE SKIN AND SUBCUTANEOUS TISSUE: ICD-10-CM

## 2024-02-29 PROBLEM — D48.5 NEOPLASM OF UNCERTAIN BEHAVIOR OF SKIN: Status: ACTIVE | Noted: 2024-02-29

## 2024-02-29 PROCEDURE — 11104 PUNCH BX SKIN SINGLE LESION: CPT

## 2024-02-29 PROCEDURE — OTHER BIOPSY BY PUNCH METHOD: OTHER

## 2024-02-29 PROCEDURE — OTHER COUNSELING: OTHER

## 2024-02-29 ASSESSMENT — LOCATION ZONE DERM: LOCATION ZONE: FEET

## 2024-02-29 ASSESSMENT — LOCATION SIMPLE DESCRIPTION DERM: LOCATION SIMPLE: RIGHT PLANTAR SURFACE

## 2024-02-29 ASSESSMENT — LOCATION DETAILED DESCRIPTION DERM: LOCATION DETAILED: RIGHT PLANTAR FOREFOOT OVERLYING 3RD METATARSAL

## 2024-02-29 NOTE — PROCEDURE: BIOPSY BY PUNCH METHOD
Detail Level: Detailed
Was A Bandage Applied: Yes
Punch Size In Mm: 2
Size Of Lesion In Cm (Optional): 0.1
X Size Of Lesion In Cm (Optional): 0
Depth Of Punch Biopsy: dermis
Biopsy Type: H and E
Anesthesia Type: 0.5% lidocaine with 1:200,000 epinephrine and a 1:10 solution of 8.4% sodium bicarbonate
Anesthesia Volume In Cc: 0.5
Hemostasis: None
Epidermal Sutures: 4-0 Nylon
Wound Care: Petrolatum
Dressing: bandage
Suture Removal: 14 days
Patient Will Remove Sutures At Home?: No
Lab: -[id number]
Consent: Written consent was obtained and risks were reviewed including but not limited to scarring, infection, bleeding, scabbing, incomplete removal, nerve damage and allergy to anesthesia.
Post-Care Instructions: I reviewed with the patient in detail post-care instructions. Patient is to keep the biopsy site dry overnight, and then apply bacitracin twice daily until healed. Patient may apply hydrogen peroxide soaks to remove any crusting.
Home Suture Removal Text: Patient was provided a home suture removal kit and will remove their sutures at home.  If they have any questions or difficulties they will call the office.
Notification Instructions: Patient will be notified of biopsy results. However, patient instructed to call the office if not contacted within 2 weeks.
Billing Type: Third-Party Bill
Information: Selecting Yes will display possible errors in your note based on the variables you have selected. This validation is only offered as a suggestion for you. PLEASE NOTE THAT THE VALIDATION TEXT WILL BE REMOVED WHEN YOU FINALIZE YOUR NOTE. IF YOU WANT TO FAX A PRELIMINARY NOTE YOU WILL NEED TO TOGGLE THIS TO 'NO' IF YOU DO NOT WANT IT IN YOUR FAXED NOTE.

## 2024-03-01 NOTE — TELEPHONE ENCOUNTER
Randee,   Pt calling to schedule screening colon that was ordered in March 2023 via telemedicine visit. Please review below and give orders if appropriate.  Thanks,  Kiara    Anticoagulants: No  Diuretics: No  Diabetic Med's (PO/Injectables): No  Weight loss Med's: No  Iron/Herbal/Multivitamin Supplements (RX/OTC): yes  Marijuana/Vaping/CBD: No  Height & Weight: 5'7\"/143 lbs  BMI: 22.49  Hx of Cardiac/CVA Issues/(MI/Stroke):No  Devices Pacemaker/Defibrillator/Stents: No  Respiratory Issues/Oxygen Use/SWAPNA/COPD: No  Issues w/ Anesthesia: No    Symptoms (Y/N): No  Symptoms Details: No    Special Comments/Notes: Pt had elevated b/p's at last few office appts. Pt states she has severe anxiety about her b/p and will worry about it for days before procedure. Pt checks b/p several times a week at home and is always around 120/80. Pt has taken xanax in the past for pre-procedure (last prescribed by breast ca MD).    Also, pt would like to have done during summer. 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     Please advise on orders and prep. Meds, allergies, and pharmacy verified with pt.     Thank you!

## 2024-03-04 RX ORDER — POLYETHYLENE GLYCOL 3350, SODIUM CHLORIDE, SODIUM BICARBONATE, POTASSIUM CHLORIDE 420; 11.2; 5.72; 1.48 G/4L; G/4L; G/4L; G/4L
POWDER, FOR SOLUTION ORAL
Qty: 4000 ML | Refills: 0 | Status: SHIPPED | OUTPATIENT
Start: 2024-03-04

## 2024-03-04 NOTE — TELEPHONE ENCOUNTER
Scheduling:  cln w/ mac w/ Dr. Orlando, Dr. Vick  Dx: crc screening  trilyte split dose sent e-scribe    Noted htn on reading 12/2023-seemingly at visit with surg/onc to discuss excisional bx results:  residual atypical ductal hyperplasia and flat epithelial atypia.  No further surgery is needed at this time.  We discussed the diagnosis and higher risk for the development of breast cancer as a result.  We briefly discussed the role of chemoprevention and due to concern of risk and side effects we will defer presently.     I contacted the pt-  She reports white coat htn  No ha, cp, vision change    Takes b/p at home    Last reading 2/21/24:  104/81    Does not have a pcp-  Does have f/u with ob/gyne.  If significant htn at visit/asymptomatic/further w/u recommended, she corbin contact the office and understands cln for crc screening would be deferred until after w/u complete. If additional work-up is indicated, she will contact the office.  Plan for cln as previously ordered.

## 2024-03-04 NOTE — TELEPHONE ENCOUNTER
1. Schedule colonoscopy with MAC w/ Dr. Vick or Dr. Orlando [Diagnosis: crc screening, hemorrhoids]     2.  bowel prep from pharmacy (split trilyte)     3. Continue all medications for procedure     4. Read all bowel prep instructions carefully     5. AVOID seeds, nuts, popcorn, raw fruits and vegetables (cooked is okay) for 2-3 days before procedure     6. You will need to be tested for COVID within 72 hours of your procedure.  You will be contacted with instructions on how to do this.        >>>Please note: if you were prescribed Suprep for the bowel prep and it is too expensive or not covered by insurance, it is okay to substitute Trilyte (or any similar generic prep). This can be done by notifying the pharmacy or calling our office.

## 2024-03-06 NOTE — TELEPHONE ENCOUNTER
Scheduled for:  Colonoscopy [id number]  Provider Name:  Dr. Vick   Date:  7/18/2024  Location:    [facility]  Sedation:  Mac  Time:  10:45 (patient is aware arrival time is 9:45 )  Prep:  Trilyte   Meds/Allergies Reconciled?:  Physician reviewed   Diagnosis with codes:  Colon cancer screening Z12.11 Hemorrhoids K64.9   Was patient informed to call insurance with codes (Y/N):  Yes, I confirmed BCBS insurance with the patient.   Referral sent?:  Referral was sent at the time of electronic surgical scheduling.  [facility] or [facility] notified?:  I sent an electronic request to Endo Scheduling and received a confirmation today.   Medication Orders:  This patient verbally confirmed that she is not taking:   Iron, blood thinners, BP meds, and is not diabetic   Not latex allergy, Not PCN allergy and does not have a pacemaker  Misc Orders:  I discussed the prep instructions with the patient which she verbally understood and is aware that I will mychart  the instructions today.  Further instructions given by staff:

## 2024-03-13 ENCOUNTER — APPOINTMENT (OUTPATIENT)
Dept: URBAN - METROPOLITAN AREA CLINIC 244 | Age: 49
Setting detail: DERMATOLOGY
End: 2024-03-13

## 2024-03-28 ENCOUNTER — APPOINTMENT (OUTPATIENT)
Dept: OBGYN | Age: 49
End: 2024-03-28

## 2024-06-06 ENCOUNTER — OFFICE VISIT (OUTPATIENT)
Dept: FAMILY MEDICINE CLINIC | Facility: CLINIC | Age: 49
End: 2024-06-06
Payer: COMMERCIAL

## 2024-06-06 VITALS
TEMPERATURE: 99 F | DIASTOLIC BLOOD PRESSURE: 88 MMHG | RESPIRATION RATE: 16 BRPM | OXYGEN SATURATION: 99 % | HEART RATE: 88 BPM | SYSTOLIC BLOOD PRESSURE: 138 MMHG

## 2024-06-06 DIAGNOSIS — R09.82 PND (POST-NASAL DRIP): ICD-10-CM

## 2024-06-06 DIAGNOSIS — R05.8 DRY COUGH: Primary | ICD-10-CM

## 2024-06-06 PROCEDURE — 3075F SYST BP GE 130 - 139MM HG: CPT | Performed by: NURSE PRACTITIONER

## 2024-06-06 PROCEDURE — 3079F DIAST BP 80-89 MM HG: CPT | Performed by: NURSE PRACTITIONER

## 2024-06-06 PROCEDURE — 99213 OFFICE O/P EST LOW 20 MIN: CPT | Performed by: NURSE PRACTITIONER

## 2024-06-06 RX ORDER — BENZONATATE 200 MG/1
CAPSULE ORAL
Qty: 20 CAPSULE | Refills: 0 | Status: SHIPPED | OUTPATIENT
Start: 2024-06-06

## 2024-06-06 NOTE — PROGRESS NOTES
CHIEF COMPLAINT:     Chief Complaint   Patient presents with    Cough     Sx 1 week - Dry cough, slight nasal congestion,headache after taking Mucinex DM  Denies fever, SOB, wheezing, ear pain/pressure  No Covid test was done at home  Pt was on way back from [place] when this started  OTC Mucinex DM       HPI:   Kiley Mccollum is a 49 year old female presents to clinic with complaint of cough symptoms for the past 1 week.  C/o dry cough, slight nasal congestion, mild headache (only after taking Mucinex DM).  Denies fever, dyspnea, wheezing, sore throat, sinus pain, purulent drainage, SOB, chest pain, GI complaints, ear pain, or rashes.  Recent travel to [place].  No family members are sick.  +Hx of allergies, they did also recently acquire a Dog after he father passed.  She has a known allergy, they have had the dog a couple months.  Hx of sports induced asthma.  Denies hx of reflux.     Current Outpatient Medications   Medication Sig Dispense Refill    benzonatate 200 MG Oral Cap Take 1 capsule PO every 8 hours PRN cough 20 capsule 0    LEVOTHYROXINE SODIUM 125 MCG OR TABS 1 TABLET DAILY      MULTIPLE VITAMINS/WOMENS OR 1 QD      PEG 3350-KCl-Na Bicarb-NaCl (TRILYTE) 420 g Oral Recon Soln Take prep as directed by gastro office. May substitute with Trilyte/generic equivalent if needed. (Patient not taking: Reported on 2024) 4000 mL 0    EPINEPHrine (EPIPEN) 0.3 MG/0.3ML Injection Solution Auto-injector Inject 1 Dose into the muscle as needed (allergic reaction). Call 911 if used. 2 each 3      Past Medical History:    Asthma (HCC)    exercise    Chronic rhinitis    Disorder of thyroid    Infertility, female    Osteomyelitis of jaw    d/t dental infection. hospilitized for surgery to remove infected part    Pap smear for cervical cancer screening    WNL     Pregnancy (HCC)        Thrombocytopenia (HCC)    in childhood.  Observation    Thyroid disease    Visual impairment    glasses      Social  History:  Social History     Socioeconomic History    Marital status:    Tobacco Use    Smoking status: Never    Smokeless tobacco: Never   Vaping Use    Vaping status: Never Used   Substance and Sexual Activity    Alcohol use: Yes     Alcohol/week: 2.5 standard drinks of alcohol     Types: 3 drink(s) per week     Comment: weekends    Drug use: No    Sexual activity: Yes     Partners: Male   Other Topics Concern    Caffeine Concern Yes     Comment: 2 cups coffee/day    Exercise Yes     Comment: 5 x a week,cardio,weight.        REVIEW OF SYSTEMS:   GENERAL HEALTH: feels well otherwise, normal appetite  SKIN: denies any unusual skin lesions or rashes  HEENT: denies ear pain or difficulty swallowing/eating. See HPI  RESPIRATORY: denies shortness of breath or wheezing  CARDIOVASCULAR: denies chest pain or palpitations   GI: denies vomiting or diarrhea  NEURO: denies dizziness or lightheadedness    EXAM:   /88   Pulse 88   Temp 98.6 °F (37 °C)   Resp 16   LMP 05/30/2024 (Approximate)   SpO2 99%   GENERAL: well developed, well nourished, in no apparent distress  SKIN: no rashes, no suspicious lesions  HEAD: atraumatic, normocephalic  EYES: conjunctiva clear, EOM intact  EARS: TM's clear, non-injected, no bulging, retraction, or fluid bilaterally  NOSE: nostrils patent, no exudates, nasal mucosa pink and +mildly inflamed  THROAT: oral mucosa pink, moist.  Posterior pharynx non-erythematous. No exudates.   NECK: supple, non-tender  LUNGS: clear to auscultation bilaterally, no wheezes or rhonchi. Breathing is non labored. +Dry cough.  CARDIO: RRR without murmur  LYMPH: No lymphadenopathy.    No results found for this or any previous visit (from the past 24 hour(s)).      ASSESSMENT AND PLAN:   Assessment:  Kiley was seen today for cough.    Diagnoses and all orders for this visit:    Dry cough  -     benzonatate 200 MG Oral Cap; Take 1 capsule PO every 8 hours PRN cough    PND (post-nasal drip)  -      benzonatate 200 MG Oral Cap; Take 1 capsule PO every 8 hours PRN cough          Plan:   - Cough may be viral vs allergic in nature.  Semi recently acquired a dog (has had known allergies in the past).  Lungs clear, stable vitals, no dyspnea/SOB.  - Continue Allegra, add Flonase  - Benzonatate PRN cough.  - Comfort measures explained and discussed as listed in Patient Instructions.  - Try to avoid allergens.  - Advised follow up in 3-5 days if not improving, condition worsens, or new/persistent fevers.  - Pt verbalizes understanding and is agreeable w/ plan.    There are no Patient Instructions on file for this visit.

## 2024-06-24 ENCOUNTER — APPOINTMENT (OUTPATIENT)
Dept: OBGYN | Age: 49
End: 2024-06-24

## 2024-07-01 ENCOUNTER — HOSPITAL ENCOUNTER (OUTPATIENT)
Dept: GENERAL RADIOLOGY | Facility: HOSPITAL | Age: 49
Discharge: HOME | End: 2024-07-01
Attending: NURSE PRACTITIONER
Payer: COMMERCIAL

## 2024-07-01 ENCOUNTER — OFFICE VISIT (OUTPATIENT)
Dept: FAMILY MEDICINE CLINIC | Facility: CLINIC | Age: 49
End: 2024-07-01
Payer: COMMERCIAL

## 2024-07-01 VITALS
DIASTOLIC BLOOD PRESSURE: 91 MMHG | TEMPERATURE: 99 F | BODY MASS INDEX: 22.7 KG/M2 | WEIGHT: 144.63 LBS | OXYGEN SATURATION: 99 % | HEIGHT: 67 IN | HEART RATE: 86 BPM | SYSTOLIC BLOOD PRESSURE: 144 MMHG | RESPIRATION RATE: 16 BRPM

## 2024-07-01 DIAGNOSIS — R05.2 SUBACUTE COUGH: ICD-10-CM

## 2024-07-01 DIAGNOSIS — R06.2 WHEEZING: ICD-10-CM

## 2024-07-01 DIAGNOSIS — R03.0 ELEVATED BLOOD PRESSURE READING: ICD-10-CM

## 2024-07-01 DIAGNOSIS — R06.2 WHEEZING: Primary | ICD-10-CM

## 2024-07-01 PROBLEM — R92.1 BREAST CALCIFICATIONS ON MAMMOGRAM: Status: ACTIVE | Noted: 2023-03-07

## 2024-07-01 PROCEDURE — 3008F BODY MASS INDEX DOCD: CPT | Performed by: NURSE PRACTITIONER

## 2024-07-01 PROCEDURE — 99213 OFFICE O/P EST LOW 20 MIN: CPT | Performed by: NURSE PRACTITIONER

## 2024-07-01 PROCEDURE — 3080F DIAST BP >= 90 MM HG: CPT | Performed by: NURSE PRACTITIONER

## 2024-07-01 PROCEDURE — 3077F SYST BP >= 140 MM HG: CPT | Performed by: NURSE PRACTITIONER

## 2024-07-01 PROCEDURE — 71046 X-RAY EXAM CHEST 2 VIEWS: CPT | Performed by: NURSE PRACTITIONER

## 2024-07-01 RX ORDER — ALBUTEROL SULFATE 90 UG/1
1 AEROSOL, METERED RESPIRATORY (INHALATION) EVERY 4 HOURS PRN
Qty: 1 EACH | Refills: 0 | Status: SHIPPED | OUTPATIENT
Start: 2024-07-01

## 2024-07-01 NOTE — PROGRESS NOTES
CHIEF COMPLAINT:     Chief Complaint   Patient presents with    Cough     Sx 1 month - Productive cough, chest congestion, slight wheezing, slight nasal congestion  Denies fever, SOB, ear pain/pressure, sinus pressure, headaches  No Covid test was done at home   Pt was on way back from [place] when this started   OTC Flonase, Allegra       HPI:   Kiley Mccollum is a 49 year old female who presents for upper respiratory symptoms for  1 month. Patient reports congestion, cough with clear colored sputum, cough is not keeping pt up at night, wheezing, prior history of bronchitis, denies fever, denies sinus pain. Symptoms have been lingering and worsening since onset.  Treating symptoms with allegra and Flonase, used an  rx for Augmentin at home. 2 times a day for 7 days with no relief in chest congestion sensation, has not been needed the benzonatate.  Pt has a history of  exercise induced asthma, no inhaler use . Sees new PCP next week. Denies chest pain, difficulty breathing, and shortness of breath. No COVID testing at home. Received COVID vaccines.     Current Outpatient Medications   Medication Sig Dispense Refill    albuterol 108 (90 Base) MCG/ACT Inhalation Aero Soln Inhale 1 puff into the lungs every 4 (four) hours as needed for Wheezing. 1 each 0    LEVOTHYROXINE SODIUM 125 MCG OR TABS 1 TABLET DAILY      MULTIPLE VITAMINS/WOMENS OR 1 QD      EPINEPHrine (EPIPEN) 0.3 MG/0.3ML Injection Solution Auto-injector Inject 1 Dose into the muscle as needed (allergic reaction). Call 911 if used. 2 each 3      Past Medical History:    Asthma (HCC)    exercise    Chronic rhinitis    Disorder of thyroid    Infertility, female    Osteomyelitis of jaw    d/t dental infection. hospilitized for surgery to remove infected part    Pap smear for cervical cancer screening    WNL     Pregnancy (HCC)        Thrombocytopenia (HCC)    in childhood.  Observation    Thyroid disease    Visual impairment    glasses       Past Surgical History:   Procedure Laterality Date    Colposcopy, cervix w/upper adjacent vagina; w/biopsy(s), cervix      Cryocautery of cervix      George biopsy stereo nodule 1 site right (cpt=19081) Right 10/11/2023    calcs   - buckle clip    Oral surgery procedure  01/01/1994    removed infection from jaw    Other surgical history      jaw -- age 18         Social History     Socioeconomic History    Marital status:    Tobacco Use    Smoking status: Never    Smokeless tobacco: Never   Vaping Use    Vaping status: Never Used   Substance and Sexual Activity    Alcohol use: Yes     Alcohol/week: 2.5 standard drinks of alcohol     Types: 3 drink(s) per week     Comment: weekends    Drug use: No    Sexual activity: Yes     Partners: Male   Other Topics Concern    Caffeine Concern Yes     Comment: 2 cups coffee/day    Exercise Yes     Comment: 5 x a week,cardio,weight.         REVIEW OF SYSTEMS:   Review of Systems       EXAM:   BP (!) 144/91   Pulse 86   Temp 98.5 °F (36.9 °C)   Resp 16   Ht 5' 7\" (1.702 m)   Wt 144 lb 9.6 oz (65.6 kg)   LMP 06/24/2024 (Approximate)   SpO2 99%   BMI 22.65 kg/m²   Physical Exam      ASSESSMENT AND PLAN:   Kiley Mccollum is a 49 year old female who presents with upper respiratory symptoms that are consistent with    ASSESSMENT:   Encounter Diagnoses   Name Primary?    Wheezing Yes    Subacute cough     Elevated blood pressure reading        PLAN: Pt sent for out-patient chest x-ray, negative for pneumonia. Will treat for bronchospasm with albuterol inhaler. Please continue antihistamine and Flonase. May use mucinex for cough along with prescribed medication. Meds as below.  Comfort care as described in Patient Instructions. If shortness of breath, difficulty breathing and/or chest pain occur, report to the nearest emergency room for prompt evaluation.     Meds & Refills for this Visit:  Requested Prescriptions     Signed Prescriptions Disp Refills    albuterol 108  (90 Base) MCG/ACT Inhalation Aero Soln 1 each 0     Sig: Inhale 1 puff into the lungs every 4 (four) hours as needed for Wheezing.     Risks, benefits, and side effects of medication explained and discussed.    The patient indicates understanding of these issues and agrees to the plan.  The patient is asked to f/u with PCP if sx's persist.

## 2024-07-02 ENCOUNTER — HOSPITAL ENCOUNTER (OUTPATIENT)
Dept: MAMMOGRAPHY | Facility: HOSPITAL | Age: 49
Discharge: HOME | End: 2024-07-02
Attending: SURGERY
Payer: COMMERCIAL

## 2024-07-02 DIAGNOSIS — N60.91 ATYPICAL DUCTAL HYPERPLASIA OF RIGHT BREAST: ICD-10-CM

## 2024-07-02 PROCEDURE — 77062 BREAST TOMOSYNTHESIS BI: CPT | Performed by: SURGERY

## 2024-07-02 PROCEDURE — 77066 DX MAMMO INCL CAD BI: CPT | Performed by: SURGERY

## 2024-07-12 ENCOUNTER — OFFICE VISIT (OUTPATIENT)
Dept: SURGERY | Facility: CLINIC | Age: 49
End: 2024-07-12
Payer: COMMERCIAL

## 2024-07-12 VITALS
RESPIRATION RATE: 18 BRPM | BODY MASS INDEX: 23 KG/M2 | SYSTOLIC BLOOD PRESSURE: 130 MMHG | OXYGEN SATURATION: 99 % | DIASTOLIC BLOOD PRESSURE: 90 MMHG | HEART RATE: 83 BPM | WEIGHT: 145 LBS | TEMPERATURE: 98 F

## 2024-07-12 DIAGNOSIS — N60.91 ATYPICAL HYPERPLASIA OF RIGHT BREAST: ICD-10-CM

## 2024-07-12 DIAGNOSIS — Z91.89 AT HIGH RISK FOR BREAST CANCER: ICD-10-CM

## 2024-07-12 DIAGNOSIS — N60.91 ATYPICAL DUCTAL HYPERPLASIA OF RIGHT BREAST: Primary | ICD-10-CM

## 2024-07-12 PROCEDURE — 99213 OFFICE O/P EST LOW 20 MIN: CPT | Performed by: SURGERY

## 2024-07-12 PROCEDURE — 3075F SYST BP GE 130 - 139MM HG: CPT | Performed by: SURGERY

## 2024-07-12 PROCEDURE — 3080F DIAST BP >= 90 MM HG: CPT | Performed by: SURGERY

## 2024-07-15 NOTE — PROGRESS NOTES
Breast Surgery Surveillance Visit    Diagnosis: Atypical ductal hyperplasia, flat epithelial atypia, right breast, s/p two site excisional biopsy on 12/4/2023    Stage: N/A    Disease Status:  Surgical treatment complete, chemoprevention declined and high risk surveillance ongoing.    History of Present Illness:   Ms. Kiley Mccollum is a 49 year old woman who presents with an imaging detected concern of the right breast.  She denies any palpable masses, nipple discharge, skin changes or axillary symptoms.  She has no personal prior history of breast disease or biopsies.  She does have a left extensive family history of breast cancer with no known genetic testing in the family.  She had her screening mammogram on March 4, 2023 and at that time was found of grouped calcifications in the right breast which additional imaging was recommended.  A right diagnostic evaluation on March 29, 2023 demonstrated these calcifications thought to be benign for short-term surveillance was recommended.  A right diagnostic evaluation repeated on October 9, 2023 showed interval increase in these calcifications and biopsy was recommended.  Additionally, she was found to have an additional area of calcifications for 6 months surveillance was recommended pending the original biopsy results.  The biopsy took place on October 11, 2023 and confirmed a focus of atypical ductal hyperplasia associate with flat epithelial atypia. She underwent two site excisional biopsy, which occurred without complication. She denies any new clinical concerns since her last visit.  She underwent genetic testing that was negative.  She declined chemoprevention and has undergone high risk surveillance with a bilateral diagnostic evaluation on July 2, 2024 that was unremarkable. She is here today for evaluation and recommendations for further therapy.        Past Medical History:    Asthma (HCC)    exercise    Chronic rhinitis    Disorder of thyroid     Infertility, female    Osteomyelitis of jaw    d/t dental infection. hospilitized for surgery to remove infected part    Pap smear for cervical cancer screening    WNL     Pregnancy (HCC)        Thrombocytopenia (HCC)    in childhood.  Observation    Thyroid disease    Visual impairment    glasses       Past Surgical History:   Procedure Laterality Date    Colposcopy, cervix w/upper adjacent vagina; w/biopsy(s), cervix      Cryocautery of cervix      George biopsy stereo nodule 1 site right (cpt=19081) Right 10/11/2023    calcs   - buckle clip    Oral surgery procedure  1994    removed infection from jaw    Other surgical history      jaw -- age 18       Gynecological History:  Pt is a   Pt was 33 years old at time of first pregnancy.    She has cumulative breastfeeding history of 3 months.  She achieved menarche at age 18 and LMP 2023  She denies any history of hormone replacement therapy   She has history of oral contraceptive use for 28 years, currently using.  She denies infertility treatment to achieve pregnancy.    Medications:     albuterol 108 (90 Base) MCG/ACT Inhalation Aero Soln Inhale 1 puff into the lungs every 4 (four) hours as needed for Wheezing. 1 each 0    EPINEPHrine (EPIPEN) 0.3 MG/0.3ML Injection Solution Auto-injector Inject 1 Dose into the muscle as needed (allergic reaction). Call 911 if used. 2 each 3    LEVOTHYROXINE SODIUM 125 MCG OR TABS 1 TABLET DAILY      MULTIPLE VITAMINS/WOMENS OR 1 QD         Allergies:    Allergies   Allergen Reactions    Peanuts ANAPHYLAXIS    Azithromycin RASH    Clindamycin RASH    Erythromycin RASH       Family History:   Family History   Problem Relation Age of Onset    Lung Disorder Mother         sarcoidosis    Breast Cancer Paternal Grandmother 60    Lipids Paternal Grandmother         hyperlipidemia    Breast Cancer Paternal Aunt 40    Breast Cancer Paternal Aunt 60    Lung Disorder Maternal Uncle 72        sarcoidosis (cause of deatg)     Cancer Maternal Cousin Female 63        melanoma    Cancer Maternal Cousin Male         bone ca    Allergies Neg     Hypertension Neg         Maternal Grandmother       She is not of Ashkenazi [employer]ism ancestry.    Social History:  History   Alcohol Use    2.5 standard drinks of alcohol/week    3 drink(s) per week     Comment: weekends       History   Smoking Status    Never   Smokeless Tobacco    Never       Ms. Kiley Mccollum is  with 2 children. She has 0 siblings. She is currently Employed full-time    Review of Systems:  General:   The patient denies, fever, chills, night sweats, fatigue, generalized weakness, change in appetite or weight loss.    HEENT:     The patient denies eye irritation, cataracts, redness, glaucoma, yellowing of the eyes, change in vision, color blindness, or +wearing contacts/glasses. The patient denies hearing loss, ringing in the ears, ear drainage, earaches, nasal congestion, nose bleeds, snoring, pain in mouth/throat, hoarseness, change in voice, facial trauma.    Respiratory:  The patient denies chronic cough, phlegm, hemoptysis, pleurisy/chest pain, pneumonia, asthma, wheezing, difficulty in breathing with exertion, emphysema, chronic bronchitis, shortness of breath or abnormal sound when breathing.     Cardiovascular:  There is no history of chest pain, chest pressure/discomfort, palpitations, irregular heartbeat, fainting or near-fainting, difficulty breathing when lying flat, SOB/Coughing at night, swelling of the legs or chest pain while walking.    Breasts:  See history of present illness    Gastrointestinal:     There is no history of difficulty or pain with swallowing, reflux symptoms, vomiting, dark or bloody stools, constipation, yellowing of the skin, indigestion, nausea, change in bowel habits, diarrhea, abdominal pain or vomiting blood.     Genitourinary:  The patient denies frequent urination, needing to get up at night to urinate, urinary hesitancy or  retaining urine, painful urination, urinary incontinence, decreased urine stream, blood in the urine or vaginal/penile discharge.    Skin:    The patient denies rash, itching, skin lesions, +dry skin, change in skin color or change in moles.     Hematologic/Lymphatic:  The patient denies easily bruising or bleeding or persistent swollen glands or lymph nodes.     Musculoskeletal:  The patient denies muscle aches/pain, joint pain, stiff joints, neck pain, back pain or bone pain.    Neuropsychiatric:  There is no history of migraines or severe headaches, seizure/epilepsy, speech problems, coordination problems, trembling/tremors, fainting/black outs, dizziness, memory problems, loss of sensation/numbness, problems walking, weakness, tingling or burning in hands/feet. There is no history of abusive relationship, bipolar disorder, sleep disturbance, anxiety, depression or feeling of despair.    Endocrine:    There is no history of poor/slow wound healing, weight loss/gain, fertility or hormone problems, cold intolerance, +thyroid disease.     Allergic/Immunologic:  There is no history of hives, hay fever, angioedema or anaphylaxis.    /90 (BP Location: Left arm, Patient Position: Sitting, Cuff Size: adult)   Pulse 83   Temp 98 °F (36.7 °C) (Temporal)   Resp 18   Wt 65.8 kg (145 lb)   LMP 06/24/2024 (Approximate)   SpO2 99%   BMI 22.71 kg/m²     Physical Exam:  The patient is an alert, oriented, well-nourished and  well-developed woman who appears her stated age. Her speech patterns and movements are normal. Her affect is appropriate.    HEENT: The head is normocephalic. The neck is supple. The thyroid is not enlarged and is without palpable masses/nodules. There are no palpable masses. The trachea is in the midline. Conjunctiva are clear, non-icteric.    Chest: The chest expands symmetrically. The lungs are clear to auscultation.    Heart: The rhythm is regular.  There are no murmurs, rubs, gallops or  thrills.    Breasts:  Her breasts are symmetrical with a cup size 34B.  Right breast: The skin, nipple ,and areola appear normal. There is no skin dimpling with movement of the pectoralis. There is no nipple retraction. No nipple discharge can be elicited. The parenchyma is mildly nodular. There are no dominant masses in the breast. The axillary tail is normal.  There is a well-healed incision with no signs of new or recurrent disease.  Left breast:   The skin, nipple, and areola appear normal. There is no skin dimpling with movement of the pectoralis. There is no nipple retraction. No nipple discharge can be elicited. The parenchyma is mildly nodular. There are no dominant masses in the breast. The axillary tail is normal.    Abdomen:  The abdomen is soft, flat and non tender. The liver is not enlarged. There are no palpable masses.    Lymph Nodes:  The supraclavicular, axillary and cervical regions are free of significant lymphadenopathy.    Back: There is no vertebral column tenderness.    Skin: The skin appears normal. There are no suspicious appearing rashes or lesions.    Extremities: The extremities are without deformity, cyanosis or edema.    Impression:   Ms. Kiley Mccollum is a 49 year old woman presents with history of breast cancer, dense breast tissue and right breast biopsy confirmed atypical ductal hyperplasia, s/p excisional biopsy.    Recommendations:   I had a discussion with the Patient regarding her breast exam.  She is healing well since surgery with no signs of clinical concern.  I personally reviewed the imaging which is concordant with her clinical exam.  Patient underwent genetic testing that was unremarkable.  Will continue to follow her per high risk protocol in light of the history of atypia with an MRI in the fall 2024 and anticipation of her next mammogram in July 2025 as well as an annual clinical breast exam in our high-risk clinic. I personally reviewed her pathology.  Her  original pathology confirmed residual atypical ductal hyperplasia and flat epithelial atypia.    She was given ample opportunity for questions and those questions were answered to her satisfaction. She was encouraged to contact the office with any questions or concerns prior to her next scheduled appointment.     This encounter lasted a total of 25 minutes, more than 50% of which was dedicated to the discussion of management options.

## 2024-07-16 ENCOUNTER — LAB ENCOUNTER (OUTPATIENT)
Dept: LAB | Facility: HOSPITAL | Age: 49
End: 2024-07-16
Attending: INTERNAL MEDICINE
Payer: COMMERCIAL

## 2024-07-16 DIAGNOSIS — Z01.812 ENCOUNTER FOR PREOPERATIVE SCREENING LABORATORY TESTING FOR COVID-19 VIRUS: ICD-10-CM

## 2024-07-16 DIAGNOSIS — Z11.52 ENCOUNTER FOR PREOPERATIVE SCREENING LABORATORY TESTING FOR COVID-19 VIRUS: ICD-10-CM

## 2024-07-16 LAB — SARS-COV-2 RNA RESP QL NAA+PROBE: NOT DETECTED

## 2024-07-18 ENCOUNTER — ANESTHESIA (OUTPATIENT)
Dept: ENDOSCOPY | Age: 49
End: 2024-07-18
Payer: COMMERCIAL

## 2024-07-18 ENCOUNTER — ANESTHESIA EVENT (OUTPATIENT)
Dept: ENDOSCOPY | Age: 49
End: 2024-07-18
Payer: COMMERCIAL

## 2024-07-18 ENCOUNTER — HOSPITAL ENCOUNTER (OUTPATIENT)
Age: 49
Setting detail: HOSPITAL OUTPATIENT SURGERY
Discharge: HOME | End: 2024-07-18
Attending: INTERNAL MEDICINE | Admitting: INTERNAL MEDICINE
Payer: COMMERCIAL

## 2024-07-18 VITALS
SYSTOLIC BLOOD PRESSURE: 126 MMHG | DIASTOLIC BLOOD PRESSURE: 89 MMHG | WEIGHT: 143 LBS | RESPIRATION RATE: 15 BRPM | HEIGHT: 67 IN | HEART RATE: 60 BPM | TEMPERATURE: 98 F | BODY MASS INDEX: 22.44 KG/M2 | OXYGEN SATURATION: 100 %

## 2024-07-18 DIAGNOSIS — Z12.11 SCREEN FOR COLON CANCER: ICD-10-CM

## 2024-07-18 DIAGNOSIS — Z11.52 ENCOUNTER FOR PREOPERATIVE SCREENING LABORATORY TESTING FOR COVID-19 VIRUS: Primary | ICD-10-CM

## 2024-07-18 DIAGNOSIS — K64.9 HEMORRHOIDS, UNSPECIFIED HEMORRHOID TYPE: ICD-10-CM

## 2024-07-18 DIAGNOSIS — Z01.812 ENCOUNTER FOR PREOPERATIVE SCREENING LABORATORY TESTING FOR COVID-19 VIRUS: Primary | ICD-10-CM

## 2024-07-18 LAB — B-HCG UR QL: NEGATIVE

## 2024-07-18 PROCEDURE — 99070 SPECIAL SUPPLIES PHYS/QHP: CPT | Performed by: INTERNAL MEDICINE

## 2024-07-18 PROCEDURE — 45385 COLONOSCOPY W/LESION REMOVAL: CPT | Performed by: INTERNAL MEDICINE

## 2024-07-18 RX ORDER — SODIUM CHLORIDE, SODIUM LACTATE, POTASSIUM CHLORIDE, CALCIUM CHLORIDE 600; 310; 30; 20 MG/100ML; MG/100ML; MG/100ML; MG/100ML
INJECTION, SOLUTION INTRAVENOUS CONTINUOUS
Status: DISCONTINUED | OUTPATIENT
Start: 2024-07-18 | End: 2024-07-18

## 2024-07-18 RX ORDER — LIDOCAINE HYDROCHLORIDE 10 MG/ML
INJECTION, SOLUTION EPIDURAL; INFILTRATION; INTRACAUDAL; PERINEURAL AS NEEDED
Status: DISCONTINUED | OUTPATIENT
Start: 2024-07-18 | End: 2024-07-18 | Stop reason: SURG

## 2024-07-18 RX ORDER — FLUTICASONE FUROATE AND VILANTEROL 100; 25 UG/1; UG/1
1 POWDER RESPIRATORY (INHALATION) DAILY
COMMUNITY
Start: 2024-07-08

## 2024-07-18 RX ORDER — NALOXONE HYDROCHLORIDE 0.4 MG/ML
0.08 INJECTION, SOLUTION INTRAMUSCULAR; INTRAVENOUS; SUBCUTANEOUS ONCE AS NEEDED
Status: DISCONTINUED | OUTPATIENT
Start: 2024-07-18 | End: 2024-07-18

## 2024-07-18 RX ADMIN — LIDOCAINE HYDROCHLORIDE 50 MG: 10 INJECTION, SOLUTION EPIDURAL; INFILTRATION; INTRACAUDAL; PERINEURAL at 10:26:00

## 2024-07-18 RX ADMIN — SODIUM CHLORIDE, SODIUM LACTATE, POTASSIUM CHLORIDE, CALCIUM CHLORIDE: 600; 310; 30; 20 INJECTION, SOLUTION INTRAVENOUS at 10:51:00

## 2024-07-18 RX ADMIN — SODIUM CHLORIDE, SODIUM LACTATE, POTASSIUM CHLORIDE, CALCIUM CHLORIDE: 600; 310; 30; 20 INJECTION, SOLUTION INTRAVENOUS at 10:26:00

## 2024-07-18 NOTE — DISCHARGE INSTRUCTIONS
Home Care Instructions for Colonoscopy  Diet:  - Resume your regular diet as tolerated unless otherwise instructed.  - Start with light meals to minimize bloating.  - Do not drink alcohol today.    Medication:  - If you have questions about resuming your normal medications, please contact your Primary Care Physician.    Activities:  - Take it easy today. Do not return to work today.  - Do not drive today.  - Do not operate any machinery today (including kitchen equipment).    Colonoscopy:  - You may notice some rectal \"spotting\" (a little blood on the toilet tissue) for a day or two after the exam. This is normal.  - If you experience any rectal bleeding (not spotting), persistent tenderness or sharp severe abdominal pains, oral temperature over 100 degrees Fahrenheit, light-headedness or dizziness, or any other problems, contact your doctor.      **If unable to reach your doctor, please go to the [facility] Emergency Room**    - Your referring physician will receive a full report of your examination.  - If you do not hear from your doctor's office within two weeks of your biopsy, please call them for your results.    You may be able to see your laboratory results in [facility] between 4 and 7 business days.  In some cases, your physician may not have viewed the results before they are released to [facility].  If you have questions regarding your results contact the physician who ordered the test/exam by phone or via [facility] by choosing \"Ask a Medical Question.\"

## 2024-07-18 NOTE — ANESTHESIA POSTPROCEDURE EVALUATION
Patient: Kiley Mccollum    Procedure Summary       Date: 07/18/24 Room / Location: [facility] ENDOSCOPY 01 / [facility] ENDO    Anesthesia Start: 1025 Anesthesia Stop: 1055    Procedure: COLONOSCOPY Diagnosis:       Hemorrhoids, unspecified hemorrhoid type      Screen for colon cancer      (polyps,hemorrhoids)    Surgeons: Bryan Vick MD Anesthesiologist:     Anesthesia Type: MAC ASA Status: 2            Anesthesia Type: MAC    Vitals Value Taken Time   /66 07/18/24 1054   Temp 98 °F (36.7 °C) 07/18/24 1054   Pulse 70 07/18/24 1054   Resp 17 07/18/24 1054   SpO2 100 % 07/18/24 1054   Vitals shown include unfiled device data.    [facility] AN Post Evaluation:   Patient Evaluated in PACU  Patient Participation: complete - patient participated  Level of Consciousness: sleepy but conscious  Pain Score: 0  Pain Management: adequate  Airway Patency:patent  Dental exam unchanged from preop  Yes    Cardiovascular Status: stable and acceptable  Respiratory Status: acceptable and room air  Postoperative Hydration acceptable      DERRICK ARENAS CRNA  7/18/2024 10:55 AM

## 2024-07-18 NOTE — H&P
Pre Procedure History & Physical Examination    Patient Name: Kiley Mccollum  MRN: [id number]  [facility]: [id number]  YOB: 1975    Diagnosis: screening colonoscopy    Medications Prior to Admission   Medication Sig Dispense Refill Last Dose    fluticasone furoate-vilanterol 100-25 MCG/ACT Inhalation Aerosol Powder, Breath Activated Inhale 1 puff into the lungs daily.    at PRN    EPINEPHrine (EPIPEN) 0.3 MG/0.3ML Injection Solution Auto-injector Inject 1 Dose into the muscle as needed (allergic reaction). Call 911 if used. 2 each 3     LEVOTHYROXINE SODIUM 125 MCG OR TABS 1 TABLET DAILY   2024    MULTIPLE VITAMINS/WOMENS OR 1 QD   2024    albuterol 108 (90 Base) MCG/ACT Inhalation Aero Soln Inhale 1 puff into the lungs every 4 (four) hours as needed for Wheezing. (Patient not taking: Reported on 2024) 1 each 0 Not Taking at PRN     Current Facility-Administered Medications   Medication Dose Route Frequency    lactated ringers infusion   Intravenous Continuous       Allergies:   Allergies   Allergen Reactions    Peanuts ANAPHYLAXIS    Azithromycin RASH    Clindamycin RASH    Erythromycin RASH       Past Medical History:    Asthma (HCC)    exercise    Chronic rhinitis    Disorder of thyroid    Infertility, female    Osteomyelitis of jaw    d/t dental infection. hospilitized for surgery to remove infected part    Pap smear for cervical cancer screening    WNL     Pregnancy (HCC)        Thrombocytopenia (HCC)    in childhood.  Observation    Thyroid disease    Visual impairment    glasses     Past Surgical History:   Procedure Laterality Date    Colonoscopy N/A 2024    ;    Colposcopy, cervix w/upper adjacent vagina; w/biopsy(s), cervix      Cryocautery of cervix      George biopsy stereo nodule 1 site right (cpt=19081) Right 10/11/2023    calcs   - buckle clip    Oral surgery procedure  1994    removed infection from jaw    Other surgical history      jaw -- age 18      Family History   Problem Relation Age of Onset    Lung Disorder Mother         sarcoidosis    Breast Cancer Paternal Grandmother 60    Lipids Paternal Grandmother         hyperlipidemia    Breast Cancer Paternal Aunt 40    Breast Cancer Paternal Aunt 60    Lung Disorder Maternal Uncle 72        sarcoidosis (cause of deatg)    Cancer Maternal Cousin Female 63        melanoma    Cancer Maternal Cousin Male         bone ca    Allergies Neg     Hypertension Neg         Maternal Grandmother     Social History     Tobacco Use    Smoking status: Never    Smokeless tobacco: Never   Substance Use Topics    Alcohol use: Yes     Alcohol/week: 2.5 standard drinks of alcohol     Types: 3 drink(s) per week     Comment: weekends         ROS:   CONSTITUTIONAL:  negative for fevers, rigors  EYES:  negative for diplopia   RESPIRATORY:  negative for severe shortness of breath  CARDIOVASCULAR:  negative for crushing sub-sternal chest pain  GASTROINTESTINAL:  see HPI  GENITOURINARY:  negative for dysuria or gross hematuria  INTEGUMENT/BREAST:  SKIN:  negative for jaundice   ALLERGIC/IMMUNOLOGIC:  negative for hay fever  ENDOCRINE:  negative for cold intolerance and heat intolerance  MUSCULOSKELETAL:  negative for joint effusion/severe erythema  BEHAVIOR/PSYCH:  negative for psychotic behavior      PHYSICAL EXAM:   BP (!) 148/97 (BP Location: Left arm)   Pulse 86   Resp 13   Ht 5' 7\" (1.702 m)   Wt 143 lb (64.9 kg)   LMP 11/22/2023 (Approximate)   SpO2 100%   BMI 22.40 kg/m²       Gen: Patient appears comfortable and in no acute discomfort  HEENT: the sclera appears anicteric, oropharynx clear, mucus membranes appear moist  CV: regular rate and rhythm, the extremities are warm and well perfused   Lung: Moves air well; No labored breathing  Abdomen: soft, non-tender exam in all quadrants without rigidity or guarding, non-distended, no abnormal bowel sounds noted, no masses are palpated  Skin: No jaundice  Ext: no cyanosis,  clubbing or edema is evident.   Neuro: Alert and interactive, and gross movements of extremities normal    I have discussed the risks and benefits and alternatives of the procedure with the patient/family.  They understand and agree to proceed with plan of care.   I have reviewed recent H&P/clinic notes  Bryan Vick MD  [facility] - Gastroenterology  7/18/2024  10:13 AM

## 2024-07-18 NOTE — ANESTHESIA PREPROCEDURE EVALUATION
Anesthesia PreOp Note    HPI:     Kiley Mccollum is a 49 year old female who presents for preoperative consultation requested by: Bryan Vick MD    Date of Surgery: 2024    Procedure(s):  COLONOSCOPY  Indication: Hemorrhoids, unspecified hemorrhoid type / Screen for colon cancer    Relevant Problems   No relevant active problems       NPO:  Last Liquid Consumption Date: 24  Last Liquid Consumption Time: 0700  Last Solid Consumption Date: 24  Last Solid Consumption Time: 0430  Last Liquid Consumption Date: 24          History Review:  Patient Active Problem List    Diagnosis Date Noted    Atypical ductal hyperplasia of right breast 2023    Atypical hyperplasia of right breast 10/19/2023    Family history of malignant neoplasm of breast 10/19/2023    Breast calcifications on mammogram 2023    Acute upper respiratory infection 2014    Chronic lymphadenitis 2013    Food allergy 2011    Allergic to peanuts 2011    Allergic rhinitis 2011    Asthma (HCC) 2008       Past Medical History:    Asthma (HCC)    exercise    Chronic rhinitis    Disorder of thyroid    Infertility, female    Osteomyelitis of jaw    d/t dental infection. hospilitized for surgery to remove infected part    Pap smear for cervical cancer screening    WNL     Pregnancy (HCC)        Thrombocytopenia (HCC)    in childhood.  Observation    Thyroid disease    Visual impairment    glasses       Past Surgical History:   Procedure Laterality Date    Colonoscopy N/A 2024    ;    Colposcopy, cervix w/upper adjacent vagina; w/biopsy(s), cervix      Cryocautery of cervix      George biopsy stereo nodule 1 site right (cpt=19081) Right 10/11/2023    calcs   - buckle clip    Oral surgery procedure  1994    removed infection from jaw    Other surgical history      jaw -- age 18       Medications Prior to Admission   Medication Sig Dispense Refill Last Dose    fluticasone  furoate-vilanterol 100-25 MCG/ACT Inhalation Aerosol Powder, Breath Activated Inhale 1 puff into the lungs daily.    at PRN    EPINEPHrine (EPIPEN) 0.3 MG/0.3ML Injection Solution Auto-injector Inject 1 Dose into the muscle as needed (allergic reaction). Call 911 if used. 2 each 3     LEVOTHYROXINE SODIUM 125 MCG OR TABS 1 TABLET DAILY   7/18/2024    MULTIPLE VITAMINS/WOMENS OR 1 QD   7/11/2024    albuterol 108 (90 Base) MCG/ACT Inhalation Aero Soln Inhale 1 puff into the lungs every 4 (four) hours as needed for Wheezing. (Patient not taking: Reported on 7/18/2024) 1 each 0 Not Taking at PRN     Current Facility-Administered Medications Ordered in Epic   Medication Dose Route Frequency Provider Last Rate Last Admin    lactated ringers infusion   Intravenous Continuous Bryan Vick MD 20 mL/hr at 07/18/24 1008 New Bag at 07/18/24 1008     No current [facility]-ordered outpatient medications on file.       Allergies   Allergen Reactions    Peanuts ANAPHYLAXIS    Azithromycin RASH    Clindamycin RASH    Erythromycin RASH       Family History   Problem Relation Age of Onset    Lung Disorder Mother         sarcoidosis    Breast Cancer Paternal Grandmother 60    Lipids Paternal Grandmother         hyperlipidemia    Breast Cancer Paternal Aunt 40    Breast Cancer Paternal Aunt 60    Lung Disorder Maternal Uncle 72        sarcoidosis (cause of deatg)    Cancer Maternal Cousin Female 63        melanoma    Cancer Maternal Cousin Male         bone ca    Allergies Neg     Hypertension Neg         Maternal Grandmother     Social History     Socioeconomic History    Marital status:    Tobacco Use    Smoking status: Never    Smokeless tobacco: Never   Vaping Use    Vaping status: Never Used   Substance and Sexual Activity    Alcohol use: Yes     Alcohol/week: 2.5 standard drinks of alcohol     Types: 3 drink(s) per week     Comment: weekends    Drug use: No    Sexual activity: Yes     Partners: Male   Other Topics Concern     Caffeine Concern Yes     Comment: 2 cups coffee/day    Exercise Yes     Comment: 5 x a week,cardio,weight.       Available pre-op labs reviewed.  Lab Results   Component Value Date    URINEPREG Negative 07/18/2024             Vital Signs:  Body mass index is 22.4 kg/m².   height is 1.702 m (5' 7\") and weight is 64.9 kg (143 lb). Her blood pressure is 148/97 (abnormal) and her pulse is 86. Her respiration is 13 and oxygen saturation is 100%.   Vitals:    07/11/24 1649 07/18/24 1001   BP:  (!) 148/97   Pulse:  86   Resp:  13   SpO2:  100%   Weight: 64.9 kg (143 lb) 64.9 kg (143 lb)   Height: 1.702 m (5' 7\") 1.702 m (5' 7\")        Anesthesia Evaluation     Patient summary reviewed and Nursing notes reviewed    Airway   Mallampati: I  TM distance: >3 FB  Neck ROM: full  Dental - Dentition appears grossly intact     Pulmonary - normal exam    breath sounds clear to auscultation  (+) asthma (EXERCISE INDUCED)  Cardiovascular - negative ROS and normal exam    Neuro/Psych - negative ROS     GI/Hepatic/Renal - negative ROS     Endo/Other - negative ROS   (+) hypothyroidism  Abdominal  - normal exam                 Anesthesia Plan:   ASA:  2  Plan:   MAC  Informed Consent Plan and Risks Discussed With:  Patient  Discussed plan with:  Surgeon      I have informed Kiley Mccollum and/or legal guardian or family member of the nature of the anesthetic plan, benefits, risks including possible dental damage if relevant, major complications, and any alternative forms of anesthetic management.   All of the patient's questions were answered to the best of my ability. The patient desires the anesthetic management as planned.  DERRICK ARENAS CRNA  7/18/2024 10:17 AM  Present on Admission:  **None**

## 2024-07-18 NOTE — OPERATIVE REPORT
COLONOSCOPY REPORT    Kiley Mccollum     1975 Age 49 year old   PCP Ryanne Soliman MD Endoscopist Bryan Vick MD     Date of procedure: 24    Procedure: Colonoscopy w/cold snare polypectomy    Pre-operative diagnosis: screening colonoscopy    Post-operative diagnosis: flat serrated appearing right sided polyps, and external hemorrhoids    Medications: MAC sedation    Withdrawal time: 14 minutes    Procedure:  Informed consent was obtained from the patient after the risks of the procedure were discussed, including but not limited to bleeding, perforation, aspiration, infection, or possibility of a missed lesion. After discussions of the risks/benefits and alternatives to this procedure, as well as the planned sedation, the patient was placed in the left lateral decubitus position and begun on continuous blood pressure pulse oximetry and EKG monitoring and this was maintained throughout the procedure. Once an adequate level of sedation was obtained a digital rectal exam was completed. Then the lubricated tip of the [id number] diagnostic video colonoscope was inserted and advanced without difficulty to the cecum using the CO2 insufflation technique. The cecum was identified by localizing the trifold, the appendix and the ileocecal valve. Withdrawal was begun with thorough washing and careful examination of the colonic walls and folds. A routine second examination of the cecum/ascending colon was performed. Photodocumentation was obtained. The bowel prep was good. Views of the colon were good with washing. I then carefully withdrew the instrument from the patient who tolerated the procedure well.     Complications: none.    Findings:   1. 2 polyp(s) noted as follows:      A. 6-8 mm polyps x2 in the ascending colon; flat morphology; cold snare polypectomy and retrieved.     2. Diverticulosis: none appreciated.    3. Terminal ileum: the visualized mucosa appeared normal.    4. A retroflexed  view of the rectum revealed hemorrhoids.    5. The colonic mucosa throughout the colon showed normal vascular pattern, without evidence of angioectasias or inflammation.     6. HOLLY: normal rectal tone, no masses palpated.       Recommend:  Await pathology, likely repeat colonoscopy in 5 years. The interval for the next colonoscopy will be determined after reviewing pathology. If new signs or symptoms develop, colonoscopy may need to be repeated sooner.   High fiber diet.  Monitor for blood in the stool. If having more than just tinge of blood, call office or go to the ER.    >>>If tissue was obtained and you have not received your pathology results either by phone or letter within 2 weeks, please call our office at [phone].    Specimens: ascending polyps

## 2024-07-18 NOTE — PRE-SEDATION ASSESSMENT
Physician Pre-Sedation Assessment    Pre-Sedation Assessment:    Sedation History: Airway Assessed    Cardiac: normal S1, S2  Respiratory: breath sounds clear bilaterally   Abdomen: soft, BS (+), non-tender    ASA Classification: 1. Normal healthy patient    Plan: Mac sedation

## 2024-07-24 ENCOUNTER — TELEPHONE (OUTPATIENT)
Facility: CLINIC | Age: 49
End: 2024-07-24

## 2024-07-24 ENCOUNTER — MED REC SCAN ONLY (OUTPATIENT)
Facility: CLINIC | Age: 49
End: 2024-07-24

## 2024-07-24 ENCOUNTER — APPOINTMENT (OUTPATIENT)
Dept: OBGYN | Age: 49
End: 2024-07-24

## 2024-07-24 VITALS
DIASTOLIC BLOOD PRESSURE: 111 MMHG | TEMPERATURE: 98.2 F | BODY MASS INDEX: 23.03 KG/M2 | HEART RATE: 88 BPM | WEIGHT: 143.3 LBS | SYSTOLIC BLOOD PRESSURE: 163 MMHG | OXYGEN SATURATION: 100 % | HEIGHT: 66 IN

## 2024-07-24 DIAGNOSIS — Z12.31 SCREENING MAMMOGRAM FOR BREAST CANCER: ICD-10-CM

## 2024-07-24 DIAGNOSIS — Z01.419 ENCOUNTER FOR WELL WOMAN EXAM WITH ROUTINE GYNECOLOGICAL EXAM: Primary | ICD-10-CM

## 2024-07-24 PROCEDURE — 99396 PREV VISIT EST AGE 40-64: CPT | Performed by: OBSTETRICS & GYNECOLOGY

## 2024-07-24 RX ORDER — EPINEPHRINE 0.3 MG/.3ML
1 INJECTION SUBCUTANEOUS PRN
COMMUNITY
Start: 2023-08-02 | End: 2024-08-01

## 2024-07-24 NOTE — TELEPHONE ENCOUNTER
Recall colonoscopy in 5 years per Dr Vick.    Colon done 7/18/2024    Health maintenance updated and message sent to patient outreach to repeat colonoscopy in 5 years    Results seen by patient via mychart  Seen by patient Kiley Mccollum on 7/22/2024  9:29 PM

## 2024-07-24 NOTE — TELEPHONE ENCOUNTER
----- Message from Bryan Vick sent at 7/22/2024  7:03 PM CDT -----  GI staff: please place recall for colonoscopy in 5 years

## 2024-09-23 DIAGNOSIS — R06.2 WHEEZING: ICD-10-CM

## 2024-09-24 RX ORDER — ALBUTEROL SULFATE 90 UG/1
1 INHALANT RESPIRATORY (INHALATION) EVERY 4 HOURS PRN
Qty: 8.5 G | Refills: 0 | OUTPATIENT
Start: 2024-09-24

## 2024-10-13 ENCOUNTER — HOSPITAL ENCOUNTER (OUTPATIENT)
Dept: MRI IMAGING | Facility: HOSPITAL | Age: 49
Discharge: HOME | End: 2024-10-13
Attending: SURGERY
Payer: COMMERCIAL

## 2024-10-13 ENCOUNTER — HOSPITAL ENCOUNTER (OUTPATIENT)
Dept: MRI IMAGING | Facility: HOSPITAL | Age: 49
End: 2024-10-13
Attending: SURGERY
Payer: COMMERCIAL

## 2024-10-13 DIAGNOSIS — N60.91 ATYPICAL DUCTAL HYPERPLASIA OF RIGHT BREAST: ICD-10-CM

## 2024-10-13 PROCEDURE — A9575 INJ GADOTERATE MEGLUMI 0.1ML: HCPCS | Performed by: SURGERY

## 2024-10-13 PROCEDURE — 77049 MRI BREAST C-+ W/CAD BI: CPT | Performed by: SURGERY

## 2024-10-13 RX ORDER — GADOTERATE MEGLUMINE 376.9 MG/ML
15 INJECTION INTRAVENOUS
Status: COMPLETED | OUTPATIENT
Start: 2024-10-13 | End: 2024-10-13

## 2024-10-13 RX ADMIN — GADOTERATE MEGLUMINE 13 ML: 376.9 INJECTION INTRAVENOUS at 16:01:00

## 2024-10-16 DIAGNOSIS — R92.8 ABNORMAL MRI, BREAST: Primary | ICD-10-CM

## 2024-10-23 ENCOUNTER — HOSPITAL ENCOUNTER (OUTPATIENT)
Dept: CT IMAGING | Facility: HOSPITAL | Age: 49
Discharge: HOME | End: 2024-10-23
Attending: SURGERY
Payer: COMMERCIAL

## 2024-10-23 DIAGNOSIS — R92.8 ABNORMAL MRI, BREAST: ICD-10-CM

## 2024-10-23 LAB
CREAT BLD-MCNC: 0.9 MG/DL
EGFRCR SERPLBLD CKD-EPI 2021: 79 ML/MIN/1.73M2 (ref 60–?)

## 2024-10-23 PROCEDURE — 71260 CT THORAX DX C+: CPT | Performed by: SURGERY

## 2024-10-23 PROCEDURE — 82565 ASSAY OF CREATININE: CPT

## 2025-01-24 ENCOUNTER — ORDER TRANSCRIPTION (OUTPATIENT)
Dept: ADMINISTRATIVE | Facility: HOSPITAL | Age: 50
End: 2025-01-24

## 2025-01-24 DIAGNOSIS — G56.23 ULNAR NEUROPATHY OF BOTH UPPER EXTREMITIES: ICD-10-CM

## 2025-01-24 DIAGNOSIS — G25.2 ACTION TREMOR: Primary | ICD-10-CM

## 2025-02-21 ENCOUNTER — OFFICE VISIT (OUTPATIENT)
Dept: NEUROLOGY | Facility: CLINIC | Age: 50
End: 2025-02-21
Payer: COMMERCIAL

## 2025-02-21 VITALS
WEIGHT: 145 LBS | BODY MASS INDEX: 23.3 KG/M2 | DIASTOLIC BLOOD PRESSURE: 87 MMHG | SYSTOLIC BLOOD PRESSURE: 139 MMHG | HEIGHT: 66 IN | HEART RATE: 84 BPM

## 2025-02-21 DIAGNOSIS — R25.1 EXCESSIVE PHYSIOLOGIC TREMOR: Primary | ICD-10-CM

## 2025-02-21 PROCEDURE — 3008F BODY MASS INDEX DOCD: CPT | Performed by: INTERNAL MEDICINE

## 2025-02-21 PROCEDURE — 3075F SYST BP GE 130 - 139MM HG: CPT | Performed by: INTERNAL MEDICINE

## 2025-02-21 PROCEDURE — 99203 OFFICE O/P NEW LOW 30 MIN: CPT | Performed by: INTERNAL MEDICINE

## 2025-02-21 PROCEDURE — 3079F DIAST BP 80-89 MM HG: CPT | Performed by: INTERNAL MEDICINE

## 2025-02-21 NOTE — PROGRESS NOTES
[facility]  [address] [place] [address], [address]  [facility] [address]  [phone]            Neurology Initial Visit     Referred By: Dr. Doran ref. provider found    Chief Complaint:   Chief Complaint   Patient presents with    Neurologic Problem     New patient presents with shaking in her first and second digits bilaterally. She first notice this around [facility]. She has completed a NCV /EMG a couple of weeks ago through [facility]. She states that it was normal.       HPI:     Kiley Mccollum is a 49 year old female with history of hypothyroidism, who presents for evaluation of tremors.  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  Does not notice the tremor all the time, but when it does occur seems to be in both hands and with actions rather than rest.  Notices that a heavier utensil is easier, and that a lighter cup like a solo cup will make the tremor more noticeable.  Notices that hunger, exercise, and being very tired will bring on the tremor more.  Her mother did have tremors of the arms and had as she got older, but she does not recall the starting until she was closer to 60.  Denies any numbness or tingling in the hands.    Past Medical History:    Asthma (HCC)    exercise    Chronic rhinitis    Disorder of thyroid    Infertility, female    Osteomyelitis of jaw    d/t dental infection. hospilitized for surgery to remove infected part    Pap smear for cervical cancer screening    WNL     Pregnancy (HCC)        Thrombocytopenia    in childhood.  Observation    Thyroid disease    Visual impairment    glasses       Past Surgical History:   Procedure Laterality Date    Colonoscopy N/A 2024    ; polyps,hemorrhoids    Colonoscopy N/A 2024    Procedure: COLONOSCOPY;  Surgeon: Bryan Vick MD;  Location: [facility]     Colposcopy, cervix w/upper adjacent vagina; w/biopsy(s), cervix      Cryocautery of cervix      George biopsy stereo nodule 1 site right (cpt=19081) Right 10/11/2023    calcs   - buckle clip    Oral surgery procedure  01/01/1994    removed infection from jaw    Other surgical history      jaw -- age 18       Social history:  History   Smoking Status    Never   Smokeless Tobacco    Never     History   Alcohol Use    2.5 standard drinks of alcohol/week    3 drink(s) per week     Comment: weekends     History   Drug Use No       Family History   Problem Relation Age of Onset    Lung Disorder Mother         sarcoidosis    Breast Cancer Paternal Grandmother 60    Lipids Paternal Grandmother         hyperlipidemia    Breast Cancer Paternal Aunt 40    Breast Cancer Paternal Aunt 60    Lung Disorder Maternal Uncle 72        sarcoidosis (cause of deatg)    Cancer Maternal Cousin Female 63        melanoma    Cancer Maternal Cousin Male         bone ca    Allergies Neg     Hypertension Neg         Maternal Grandmother         Current Outpatient Medications:     EPINEPHrine (EPIPEN) 0.3 MG/0.3ML Injection Solution Auto-injector, Inject 1 Dose into the muscle as needed (allergic reaction). Call 911 if used., Disp: 2 each, Rfl: 3    LEVOTHYROXINE SODIUM 125 MCG OR TABS, 1 TABLET DAILY, Disp: , Rfl:     MULTIPLE VITAMINS/WOMENS OR, 1 QD, Disp: , Rfl:     fluticasone furoate-vilanterol 100-25 MCG/ACT Inhalation Aerosol Powder, Breath Activated, Inhale 1 puff into the lungs daily. (Patient not taking: Reported on 2/21/2025), Disp: , Rfl:     albuterol 108 (90 Base) MCG/ACT Inhalation Aero Soln, Inhale 1 puff into the lungs every 4 (four) hours as needed for Wheezing. (Patient not taking: Reported on 7/18/2024), Disp: 1 each, Rfl: 0    Allergies[1]    ROS:   As in HPI, the rest of the 14 system review was done and was negative      Physical Exam:  Vitals:    02/21/25 1033   BP: 139/87   Pulse: 84   Weight: 145 lb (65.8 kg)   Height:  66\"       General: No apparent distress, well nourished, well groomed.  Head- Normocephalic, atraumatic  Eyes- No redness or swelling    Neurological:   Mental Status:  Mental Status- Alert, conversant, speech fluent, following all commands, Fund of knowledge appropriate for education and age, and Thought process intact    Cranial Nerves:  II.- Visual fields full to confrontation, III, IV, VI- EOM intact, V. Facial sensation intact, and VII. Face symmetric, no facial weakness    Motor Exam:  Strength- upper extremities 5/5 proximally and distally                - lower  extremities 5/5 proximally and distally    Sensory Exam:  Light touch- intact in all 4 extremities    Deep Tendon Reflexes:  Biceps 2+ bilateral symmetric  Triceps 2+ bilateral symmetric  Brachioradialis 2 + bilateral symmetric  Patellar 2+ bilateral symmetric  Ankle jerks 2+ bilateral symmetric    Coordination:  No dysmetria with finger to nose bilaterally  No tremor on exam    Gait:  Normal casual gait    Labs:    Lab Results   Component Value Date    TSH 0.872 01/20/2024     No results found for: \"CHOL\", \"HDL\", \"LDL\", \"TRIG\"  Lab Results   Component Value Date    HGB 15.4 01/20/2024    HCT 46.1 01/20/2024    MCV 91.5 01/20/2024    WBC 8.3 01/20/2024    .0 01/20/2024      Lab Results   Component Value Date    BUN 19 01/20/2024    CA 9.8 01/20/2024    ALT 19 01/20/2024    AST 26 01/20/2024    ALKPHOS 54 06/03/2013    ALB 4.4 01/20/2024     01/20/2024    K 4.8 01/20/2024     01/20/2024    CO2 26.0 01/20/2024      I have reviewed labs.    Imaging Studies:  I have independently reviewed imaging.        Assessment   Kiley Mccollum is a 49 year old female with history of hypothyroidism, who presents for evaluation of tremors.  Exam is normal with no tremors currently.    1. Excessive physiologic tremor  -Given benign exam and periods of complete tremor cessation, most likely this is enhanced physiologic tremor with certain  conditions such as hunger, exercise triggering a very mild tremor  -If tremor worsens, benign essential tremor could also be considered especially given family history of tremor in her mother  -Return if symptoms worsen to the point where initiation of pharmacologic agents is desired       Education and counseling provided to patient. Instructed patient to call my office or seek medical attention immediately if symptoms worsen.  Patient verbalized understanding of information given. All questions were answered. All side effects of drugs were discussed.       Return to clinic in: Return if symptoms worsen or fail to improve.    Tracy Pike MD         [1]   Allergies  Allergen Reactions    Peanuts ANAPHYLAXIS    Azithromycin RASH    Clindamycin RASH    Erythromycin RASH

## 2025-03-11 ENCOUNTER — OFFICE VISIT (OUTPATIENT)
Dept: FAMILY MEDICINE CLINIC | Facility: CLINIC | Age: 50
End: 2025-03-11
Payer: COMMERCIAL

## 2025-03-11 VITALS
OXYGEN SATURATION: 100 % | SYSTOLIC BLOOD PRESSURE: 136 MMHG | BODY MASS INDEX: 23.3 KG/M2 | TEMPERATURE: 98 F | DIASTOLIC BLOOD PRESSURE: 84 MMHG | WEIGHT: 145 LBS | RESPIRATION RATE: 18 BRPM | HEIGHT: 66 IN | HEART RATE: 85 BPM

## 2025-03-11 DIAGNOSIS — Z71.1 WORRIED WELL: Primary | ICD-10-CM

## 2025-03-11 PROCEDURE — 3008F BODY MASS INDEX DOCD: CPT | Performed by: NURSE PRACTITIONER

## 2025-03-11 PROCEDURE — 99213 OFFICE O/P EST LOW 20 MIN: CPT | Performed by: NURSE PRACTITIONER

## 2025-03-11 PROCEDURE — 3079F DIAST BP 80-89 MM HG: CPT | Performed by: NURSE PRACTITIONER

## 2025-03-11 PROCEDURE — 3075F SYST BP GE 130 - 139MM HG: CPT | Performed by: NURSE PRACTITIONER

## 2025-03-11 NOTE — PROGRESS NOTES
Subjective:   Patient ID: Kiley Mccollum is a 50 year old female.    Patient is a 50 year old female who presents today for possible retained tampon. She started her period today and put in a tampon this morning before going to the gym. When she got home she put in another tampon. She cannot remember if she took the first one out? Would like to make sure it is not retained. Denies any pain.            History/Other:   Review of Systems   Genitourinary:  Negative for vaginal pain.     Current Outpatient Medications   Medication Sig Dispense Refill    fluticasone furoate-vilanterol 100-25 MCG/ACT Inhalation Aerosol Powder, Breath Activated Inhale 1 puff into the lungs daily.      albuterol 108 (90 Base) MCG/ACT Inhalation Aero Soln Inhale 1 puff into the lungs every 4 (four) hours as needed for Wheezing. 1 each 0    EPINEPHrine (EPIPEN) 0.3 MG/0.3ML Injection Solution Auto-injector Inject 1 Dose into the muscle as needed (allergic reaction). Call 911 if used. 2 each 3    LEVOTHYROXINE SODIUM 125 MCG OR TABS 1 TABLET DAILY      MULTIPLE VITAMINS/WOMENS OR 1 QD       Allergies:Allergies[1]    /84   Pulse 85   Temp 97.6 °F (36.4 °C)   Resp 18   Ht 5' 6\" (1.676 m)   Wt 145 lb (65.8 kg)   LMP 03/11/2025 (Approximate)   SpO2 100%   BMI 23.40 kg/m²     Objective:   Physical Exam  Vitals reviewed.   Constitutional:       General: She is awake. She is not in acute distress.     Appearance: Normal appearance. She is well-developed and well-groomed. She is not ill-appearing, toxic-appearing or diaphoretic.   Cardiovascular:      Rate and Rhythm: Normal rate.   Pulmonary:      Effort: Pulmonary effort is normal.   Genitourinary:     Nestor stage (genital): 5.      Vagina: No foreign body. Vaginal discharge and bleeding present.      Comments: cervix and os visualized, + bleeding from os, + white discharge noted to vaginal canal, no retained tampon/FB noted  Skin:     General: Skin is warm and dry.    Neurological:      Mental Status: She is alert and oriented to person, place, and time.   Psychiatric:         Behavior: Behavior is cooperative.         Assessment & Plan:   1. Worried well        No orders of the defined types were placed in this encounter.      Meds This Visit:  Requested Prescriptions      No prescriptions requested or ordered in this encounter     Discussed physical exam findings with patient.  She v/u and has no further questions/concerns at this time.         [1]   Allergies  Allergen Reactions    Peanuts ANAPHYLAXIS    Azithromycin RASH    Clindamycin RASH    Erythromycin RASH

## 2025-03-18 ENCOUNTER — TELEPHONE (OUTPATIENT)
Dept: OBGYN | Age: 50
End: 2025-03-18

## 2025-03-19 ENCOUNTER — OFFICE VISIT (OUTPATIENT)
Dept: OBGYN | Age: 50
End: 2025-03-19

## 2025-03-19 VITALS
OXYGEN SATURATION: 98 % | HEART RATE: 89 BPM | TEMPERATURE: 98.2 F | SYSTOLIC BLOOD PRESSURE: 130 MMHG | DIASTOLIC BLOOD PRESSURE: 90 MMHG

## 2025-03-19 DIAGNOSIS — N89.8 VAGINAL DISCHARGE: Primary | ICD-10-CM

## 2025-03-19 PROCEDURE — 87661 TRICHOMONAS VAGINALIS AMPLIF: CPT | Performed by: CLINICAL MEDICAL LABORATORY

## 2025-03-19 PROCEDURE — 87481 CANDIDA DNA AMP PROBE: CPT | Performed by: CLINICAL MEDICAL LABORATORY

## 2025-03-19 PROCEDURE — 81513 NFCT DS BV RNA VAG FLU ALG: CPT | Performed by: CLINICAL MEDICAL LABORATORY

## 2025-03-19 PROCEDURE — 99213 OFFICE O/P EST LOW 20 MIN: CPT | Performed by: OBSTETRICS & GYNECOLOGY

## 2025-03-20 ENCOUNTER — TELEPHONE (OUTPATIENT)
Dept: OBGYN | Age: 50
End: 2025-03-20

## 2025-03-20 LAB
BACTERIAL VAGINOSIS VAG-IMP: NOT DETECTED
C ALBICANS DNA VAG QL NAA+PROBE: NOT DETECTED
C GLABRATA DNA VAG QL NAA+PROBE: NOT DETECTED
SERVICE CMNT-IMP: NORMAL
SERVICE CMNT-IMP: NORMAL
T VAGINALIS DNA VAG QL NAA+PROBE: NOT DETECTED

## 2025-03-21 ENCOUNTER — TELEPHONE (OUTPATIENT)
Dept: OBGYN | Age: 50
End: 2025-03-21

## 2025-07-10 ENCOUNTER — HOSPITAL ENCOUNTER (OUTPATIENT)
Dept: MAMMOGRAPHY | Facility: HOSPITAL | Age: 50
Discharge: HOME | End: 2025-07-10
Attending: OBSTETRICS & GYNECOLOGY
Payer: COMMERCIAL

## 2025-07-10 DIAGNOSIS — Z12.31 ENCOUNTER FOR SCREENING MAMMOGRAM FOR MALIGNANT NEOPLASM OF BREAST: ICD-10-CM

## 2025-07-10 LAB — HM MAMMOGRAPHY BILATERAL: NORMAL

## 2025-07-10 PROCEDURE — 77063 BREAST TOMOSYNTHESIS BI: CPT | Performed by: OBSTETRICS & GYNECOLOGY

## 2025-07-10 PROCEDURE — 77067 SCR MAMMO BI INCL CAD: CPT | Performed by: OBSTETRICS & GYNECOLOGY

## 2025-07-18 ENCOUNTER — CLINICAL DOCUMENTATION (OUTPATIENT)
Dept: OBGYN | Age: 50
End: 2025-07-18

## 2025-08-01 ENCOUNTER — OFFICE VISIT (OUTPATIENT)
Dept: SURGERY | Facility: CLINIC | Age: 50
End: 2025-08-01

## 2025-08-01 VITALS
RESPIRATION RATE: 16 BRPM | OXYGEN SATURATION: 100 % | HEART RATE: 77 BPM | DIASTOLIC BLOOD PRESSURE: 94 MMHG | SYSTOLIC BLOOD PRESSURE: 152 MMHG

## 2025-08-01 DIAGNOSIS — R92.30 DENSE BREASTS: ICD-10-CM

## 2025-08-01 DIAGNOSIS — N60.91 ATYPICAL DUCTAL HYPERPLASIA OF RIGHT BREAST: Primary | ICD-10-CM

## 2025-08-01 DIAGNOSIS — Z12.31 SCREENING MAMMOGRAM, ENCOUNTER FOR: ICD-10-CM

## 2025-08-01 PROCEDURE — 3077F SYST BP >= 140 MM HG: CPT | Performed by: SURGERY

## 2025-08-01 PROCEDURE — 3080F DIAST BP >= 90 MM HG: CPT | Performed by: SURGERY

## 2025-08-01 PROCEDURE — 99213 OFFICE O/P EST LOW 20 MIN: CPT | Performed by: SURGERY

## 2025-08-06 ENCOUNTER — APPOINTMENT (OUTPATIENT)
Dept: OBGYN | Age: 50
End: 2025-08-06

## 2025-09-18 ENCOUNTER — APPOINTMENT (OUTPATIENT)
Dept: OBGYN | Age: 50
End: 2025-09-18

## (undated) DEVICE — CONTAINER,SPECIMEN,OR STERILE,4OZ: Brand: MEDLINE

## (undated) DEVICE — FLEXIBLE YANKAUER,MEDIUM TIP, NO VACUUM CONTROL: Brand: ARGYLE

## (undated) DEVICE — MEDI-VAC NON-CONDUCTIVE SUCTION TUBING 6MM X 1.8M (6FT.) L: Brand: CARDINAL HEALTH

## (undated) DEVICE — SUT COAT VCRL 3-0 27IN SH ABSRB UD 26MM 1/2

## (undated) DEVICE — SUT PERMA- 2-0 18IN FS NABSRB BLK 26MM 3/8

## (undated) DEVICE — CLIP SM INTNL HMCLP TNTLM ESCP

## (undated) DEVICE — 12 ML SYRINGE LUER-LOCK TIP: Brand: MONOJECT

## (undated) DEVICE — DRAPE PACK CHEST

## (undated) DEVICE — KIT CLEAN ENDOKIT 1.1OZ GOWNX2

## (undated) DEVICE — BRA SURG ELIZ PINK M

## (undated) DEVICE — TRAP POLYP W/ 2 SPEC TY CLR MAGNIFYING WIND

## (undated) DEVICE — CLIP MED INTNL HMCLP TNTLM

## (undated) DEVICE — MINOR GENERAL: Brand: MEDLINE INDUSTRIES, INC.

## (undated) DEVICE — SYRINGE, LUER SLIP, STERILE, 60ML: Brand: MEDLINE

## (undated) DEVICE — Device: Brand: JELCO

## (undated) DEVICE — SUT MCRYL 4-0 18IN PS-2 ABSRB UD 19MM 3/8 CIR

## (undated) DEVICE — DRAPE TAPE: Brand: CONVERTORS

## (undated) DEVICE — PAD,ABDOMINAL,8"X7.5",STERILE,LF,1/PK: Brand: MEDLINE

## (undated) DEVICE — CO2 CANNULA,SSOFT,ADLT,7O2,4CO2,FEMALE: Brand: MEDLINE

## (undated) DEVICE — SOLUTION IRRIG 1000ML 0.9% NACL USP BTL

## (undated) DEVICE — ADHESIVE LIQ 2/3ML VI MASTISOL

## (undated) DEVICE — GAMMEX® PI HYBRID SIZE 6.5, STERILE POWDER-FREE SURGICAL GLOVE, POLYISOPRENE AND NEOPRENE BLEND: Brand: GAMMEX

## (undated) DEVICE — KIT ENDO ORCAPOD 160/180/190

## (undated) DEVICE — LASSO POLYPECTOMY SNARE: Brand: LASSO

## (undated) DEVICE — 6 ML SYRINGE LUER-LOCK TIP: Brand: MONOJECT

## (undated) DEVICE — STERILE LATEX POWDER-FREE SURGICAL GLOVESWITH NITRILE COATING: Brand: PROTEXIS

## (undated) NOTE — LETTER
[place] [address], [place], [place]    Authorization for Surgical Operation and Procedure                               I hereby authorize Bryan Vick MD, my physician and his/her assistants (if applicable), which may include medical students, residents, and/or fellows, to perform the following surgical operation/ procedure and administer such anesthesia as may be determined necessary by my physician: Operation/Procedure name (s) COLONOSCOPY on Kiley Mccollum   2.   I recognize that during the surgical operation/procedure, unforeseen conditions may necessitate additional or different procedures than those listed above.  I, therefore, further authorize and request that the above-named surgeon, assistants, or designees perform such procedures as are, in their judgment, necessary and desirable.    3.   My surgeon/physician has discussed prior to my surgery the potential benefits, risks and side effects of this procedure; the likelihood of achieving goals; and potential problems that might occur during recuperation.  They also discussed reasonable alternatives to the procedure, including risks, benefits, and side effects related to the alternatives and risks related to not receiving this procedure.  I have had all my questions answered and I acknowledge that no guarantee has been made as to the result that may be obtained.    4.   Should the need arise during my operation/procedure, which includes change of level of care prior to discharge, I also consent to the administration of blood and/or blood products.  Further, I understand that despite careful testing and screening of blood or blood products by collecting agencies, I may still be subject to ill effects as a result of receiving a blood transfusion and/or blood products.  The following are some, but not all, of the potential risks that can occur: fever and allergic reactions, hemolytic reactions, transmission of diseases such as  Hepatitis, AIDS and Cytomegalovirus (CMV) and fluid overload.  In the event that I wish to have an autologous transfusion of my own blood, or a directed donor transfusion, I will discuss this with my physician.  Check only if Refusing Blood or Blood Products  I understand refusal of blood or blood products as deemed necessary by my physician may have serious consequences to my condition to include possible death. I hereby assume responsibility for my refusal and release the hospital, its personnel, and my physicians from any responsibility for the consequences of my refusal.    o  Refuse   5.   I authorize the use of any specimen, organs, tissues, body parts or foreign objects that may be removed from my body during the operation/procedure for diagnosis, research or teaching purposes and their subsequent disposal by hospital authorities.  I also authorize the release of specimen test results and/or written reports to my treating physician on the hospital medical staff or other referring or consulting physicians involved in my care, at the discretion of the Pathologist or my treating physician.    6.   I consent to the photographing or videotaping of the operations or procedures to be performed, including appropriate portions of my body for medical, scientific, or educational purposes, provided my identity is not revealed by the pictures or by descriptive texts accompanying them.  If the procedure has been photographed/videotaped, the surgeon will obtain the original picture, image, videotape or CD.  The hospital will not be responsible for storage, release or maintenance of the picture, image, tape or CD.    7.   I consent to the presence of a  or observers in the operating room as deemed necessary by my physician or their designees.    8.   I recognize that in the event my procedure results in extended X-Ray/fluoroscopy time, I may develop a skin reaction.    9. If I have a Do Not Attempt  Resuscitation (DNAR) order in place, that status will be suspended while in the operating room, procedural suite, and during the recovery period unless otherwise explicitly stated by me (or a person authorized to consent on my behalf). The surgeon or my attending physician will determine when the applicable recovery period ends for purposes of reinstating the DNAR order.  10. Patients having a sterilization procedure: I understand that if the procedure is successful the results will be permanent and it will therefore be impossible for me to inseminate, conceive, or bear children.  I also understand that the procedure is intended to result in sterility, although the result has not been guaranteed.   11. I acknowledge that my physician has explained sedation/analgesia administration to me including the risk and benefits I consent to the administration of sedation/analgesia as may be necessary or desirable in the judgment of my physician.    I CERTIFY THAT I HAVE READ AND FULLY UNDERSTAND THE ABOVE CONSENT TO OPERATION and/or OTHER PROCEDURE.     ____________________________________  _________________________________        ______________________________  Signature of Patient    Signature of Responsible Person                Printed Name of Responsible Person                                      ____________________________________  _____________________________                ________________________________  Signature of Witness        Date  Time         Relationship to Patient    STATEMENT OF PHYSICIAN My signature below affirms that prior to the time of the procedure; I have explained to the patient and/or his/her legal representative, the risks and benefits involved in the proposed treatment and any reasonable alternative to the proposed treatment. I have also explained the risks and benefits involved in refusal of the proposed treatment and alternatives to the proposed treatment and have answered the patient's  questions. If I have a significant financial interest in a co-management agreement or a significant financial interest in any product or implant, or other significant relationship used in this procedure/surgery, I have disclosed this and had a discussion with my patient.     _____________________________________________________              _____________________________  (Signature of Physician)                                                                                         (Date)                                   (Time)  Patient Name: Kiley Mccollum      : 1975      Printed: 2024     Medical Record #: [id number]                                      Page 1 of 1

## (undated) NOTE — LETTER
[address] [place] [address], [place]  Authorization for Surgical Operation and Procedure                                                                                           I hereby authorize                                  , my physician and his/her assistants (if applicable), which may include medical students, residents, and/or fellows, to perform the following surgical operation/ procedure and administer such anesthesia as may be determined necessary by my physician: Operation/Procedure name (s) Right breast 2 site wire localization on [employer]   2. I recognize that during the surgical operation/procedure, unforeseen conditions may necessitate additional or different procedures than those listed above. I, therefore, further authorize and request that the above-named surgeon, assistants, or designees perform such procedures as are, in their judgment, necessary and desirable. 3.   My surgeon/physician has discussed prior to my surgery the potential benefits, risks and side effects of this procedure; the likelihood of achieving goals; and potential problems that might occur during recuperation. They also discussed reasonable alternatives to the procedure, including risks, benefits, and side effects related to the alternatives and risks related to not receiving this procedure. I have had all my questions answered and I acknowledge that no guarantee has been made as to the result that may be obtained. 4.   Should the need arise during my operation/procedure, which includes change of level of care prior to discharge, I also consent to the administration of blood and/or blood products. Further, I understand that despite careful testing and screening of blood or blood products by collecting agencies, I may still be subject to ill effects as a result of receiving a blood transfusion and/or blood products.   The following are some, but not all, of the potential risks that can occur: fever and allergic reactions, hemolytic reactions, transmission of diseases such as Hepatitis, AIDS and Cytomegalovirus (CMV) and fluid overload. In the event that I wish to have an autologous transfusion of my own blood, or a directed donor transfusion, I will discuss this with my physician. Check only if Refusing Blood or Blood Products  I understand refusal of blood or blood products as deemed necessary by my physician may have serious consequences to my condition to include possible death. I hereby assume responsibility for my refusal and release the hospital, its personnel, and my physicians from any responsibility for the consequences of my refusal.    o  Refuse   5. I authorize the use of any specimen, organs, tissues, body parts or foreign objects that may be removed from my body during the operation/procedure for diagnosis, research or teaching purposes and their subsequent disposal by hospital authorities. I also authorize the release of specimen test results and/or written reports to my treating physician on the hospital medical staff or other referring or consulting physicians involved in my care, at the discretion of the Pathologist or my treating physician. 6.   I consent to the photographing or videotaping of the operations or procedures to be performed, including appropriate portions of my body for medical, scientific, or educational purposes, provided my identity is not revealed by the pictures or by descriptive texts accompanying them. If the procedure has been photographed/videotaped, the surgeon will obtain the original picture, image, videotape or CD. The hospital will not be responsible for storage, release or maintenance of the picture, image, tape or CD.    7.   I consent to the presence of a  or observers in the operating room as deemed necessary by my physician or their designees.     8.   I recognize that in the event my procedure results in extended X-Ray/fluoroscopy time, I may develop a skin reaction. 9. If I have a Do Not Attempt Resuscitation (DNAR) order in place, that status will be suspended while in the operating room, procedural suite, and during the recovery period unless otherwise explicitly stated by me (or a person authorized to consent on my behalf). The surgeon or my attending physician will determine when the applicable recovery period ends for purposes of reinstating the DNAR order. 10. Patients having a sterilization procedure: I understand that if the procedure is successful the results will be permanent and it will therefore be impossible for me to inseminate, conceive, or bear children. I also understand that the procedure is intended to result in sterility, although the result has not been guaranteed. 11. I acknowledge that my physician has explained sedation/analgesia administration to me including the risk and benefits I consent to the administration of sedation/analgesia as may be necessary or desirable in the judgment of my physician. I CERTIFY THAT I HAVE READ AND FULLY UNDERSTAND THE ABOVE CONSENT TO OPERATION and/or OTHER PROCEDURE.     _________________________________________ _________________________________     ___________________________________  Signature of Patient     Signature of Responsible Person                   Printed Name of Responsible Person                              _________________________________________ ______________________________        ___________________________________  Signature of Witness         Date  Time         Relationship to Patient    STATEMENT OF PHYSICIAN My signature below affirms that prior to the time of the procedure; I have explained to the patient and/or his/her legal representative, the risks and benefits involved in the proposed treatment and any reasonable alternative to the proposed treatment.  I have also explained the risks and benefits involved in refusal of the proposed treatment and alternatives to the proposed treatment and have answered the patient's questions.  If I have a significant financial interest in a co-management agreement or a significant financial interest in any product or implant, or other significant relationship used in this procedure/surgery, I have disclosed this and had a discussion with my patient.     _______________________________________________________________ _____________________________  (Signature of Physician)                                                                                         (Date)                                   (Time)  Patient Name: Oumou Guevara    : 1975   Printed: 2023      Medical Record #: [id number]                                              Page 1 of 1

## (undated) NOTE — ED AVS SNAPSHOT
[facility] AND [facility] in [facility].  [address] [facility]    Phone:  [phone]    Fax:  [phone]   MRN: [id number]    Department:  [facility] in [address] [place] [address]   Date of Visit: benefit level being available to you or other limited reimbursement. The physician may seek payment directly from you for amounts other than your deductible, co-payment, or co-insurance and for other services not covered under your health insurance plan. If you believe that any of the medications or instructions on this list is different from what your Primary Care doctor has instructed you - please continue to take your medications as instructed by your Primary Care doctor until you can check with your do Patient [address] to help you get signed up for insurance coverage. Patient [address] is a Federal Navigator program that can help with your Affordable Care Act coverage, as well as all types of Medicaid plans.   To get signed up and covere

## (undated) NOTE — LETTER
[address] [id number]  Authorization for Imaging Procedure  Date of Procedure:     I hereby authorize Dr. Iris Jean , my physician and his/her assistants (if applicable), which may include medical students, residents, and/or fellows, to perform the following procedure and administer such anesthesia as may be determined necessary by my physician: [address] on [employer]. 2.  I recognize that during the procedure, unforeseen conditions may necessitate additional or different procedures than those listed above. I, therefore, further authorize and request that the above-named physician, assistants, or designees perform such procedures as are, in their judgment, necessary and desirable. 3.  My physician has discussed prior to my procedure the potential benefits, risks and side effects of this procedure; the likelihood of achieving goals; and potential problems that might occur during recuperation. They also discussed reasonable alternatives to the procedure, including risks, benefits, and side effects related to the alternatives and risks related to not receiving this procedure. I have had all my questions answered and I acknowledge that no guarantee has been made as to the result that may be obtained. 4.  Should the need arise during my procedure, which includes change of level of care prior to discharge, I also consent to the administration of blood and/or blood products. Further, I understand that despite careful testing and screening of blood or blood products by collecting agencies, I may still be subject to ill effects as a result of receiving a blood transfusion and/or blood products.  The following are some, but not all, of the potential risks that can occur: fever and allergic reactions, hemolytic reactions, transmission of diseases such as Hepatitis, AIDS and Cytomegalovirus (CMV) and fluid overload. In the event that I wish to have an autologous transfusion of my own blood, or a directed donor transfusion, I will discuss this with my physician. Check only if Refusing Blood or Blood Products  I understand refusal of blood or blood products as deemed necessary by my physician may have serious consequences to my condition to include possible death. I hereby assume responsibility for my refusal and release the hospital, its personnel, and my physicians from any responsibility for the consequences of my refusal.   [  ] Patient Refuses Blood      5. I authorize the use of any specimen, organs, tissues, body parts or foreign objects that may be removed from my body during the procedure for diagnosis, research or teaching purposes and their subsequent disposal by hospital authorities. I also authorize the release of specimen test results and/or written reports to my treating physician on the hospital medical staff or other referring or consulting physicians involved in my care, at the discretion of the Pathologist or my treating physician. 6.  I consent to the photographing or videotaping of the procedures to be performed, including appropriate portions of my body for medical, scientific, or educational purposes, provided my identity is not revealed by the pictures or by descriptive texts accompanying them. If the procedure has been photographed/videotaped, the physician will obtain the original picture, image, videotape or CD. The hospital will not be responsible for storage, release or maintenance of the picture, image, tape or CD.   7.  I consent to the presence of a  or observers in the operating room as deemed necessary by my physician or their designees. 8.  I recognize that in the event my procedure results in extended X-Ray/fluoroscopy time, I may develop a skin reaction. 9.   If I have a Do Not Attempt Resuscitation (DNAR) order in place, that status will be suspended while in the operating room, procedural suite, and during the recovery period unless otherwise explicitly stated by me (or a person authorized to consent on my behalf). The performing physician or my attending physician will determine when the applicable recovery period ends for purposes of reinstating the DNAR order. 10.  I acknowledge that my physician has explained sedation/analgesia administration to me including the risk and benefits I consent to the administration of sedation/analgesia as may be necessary or desirable in the judgment of my physician. I CERTIFY THAT I HAVE READ AND FULLY UNDERSTAND THE ABOVE CONSENT FOR THE PROCEDURE. Signature of Patient: _____________________________________________________________  Responsible person in case of minor, unconscious: ____________________________________  Relationship to patient:  __________________________________________________________  Signature of Witness: _______________________________Date: _________Time: __________    Statement of Physician: My signature below affirms that prior to the time of the procedure, I have explained to the patient and/or her guardian, the risks and benefits involved in the proposed treatment and any reasonable alternative to the proposed treatment. I have also explained the risks and benefits involved in the refusal of the proposed treatment and have answered the patient's questions. If I have a significant financial interest in a co-management agreement or a significant financial interest in any product or implant, or other significant relationship used in the procedure/surgery, I have disclosed this and had a discussion with my patient.   Signature of Physician:   _________________________________Date:_____________Time:________    Patient Name: Beverley  : 1975  Printed: October 10, 2023   Medical Record #: [id number]

## (undated) NOTE — LETTER
[address]  T[place] Prasad, [facility], [place]  Authorization for Surgical Operation and Procedure                                                                                           I hereby authorize Negrita Jennings MD, my physician and his/her assistants (if applicable), which may include medical students, residents, and/or fellows, to perform the following surgical operation/ procedure and administer such anesthesia as may be determined necessary by my physician: Operation/Procedure name (s) Right breast excisional biopsy on Jose Henriquez   2. I recognize that during the surgical operation/procedure, unforeseen conditions may necessitate additional or different procedures than those listed above. I, therefore, further authorize and request that the above-named surgeon, assistants, or designees perform such procedures as are, in their judgment, necessary and desirable. 3.   My surgeon/physician has discussed prior to my surgery the potential benefits, risks and side effects of this procedure; the likelihood of achieving goals; and potential problems that might occur during recuperation. They also discussed reasonable alternatives to the procedure, including risks, benefits, and side effects related to the alternatives and risks related to not receiving this procedure. I have had all my questions answered and I acknowledge that no guarantee has been made as to the result that may be obtained. 4.   Should the need arise during my operation/procedure, which includes change of level of care prior to discharge, I also consent to the administration of blood and/or blood products. Further, I understand that despite careful testing and screening of blood or blood products by collecting agencies, I may still be subject to ill effects as a result of receiving a blood transfusion and/or blood products.   The following are some, but not all, of the potential risks that can occur: fever and allergic reactions, hemolytic reactions, transmission of diseases such as Hepatitis, AIDS and Cytomegalovirus (CMV) and fluid overload. In the event that I wish to have an autologous transfusion of my own blood, or a directed donor transfusion, I will discuss this with my physician. Check only if Refusing Blood or Blood Products  I understand refusal of blood or blood products as deemed necessary by my physician may have serious consequences to my condition to include possible death. I hereby assume responsibility for my refusal and release the hospital, its personnel, and my physicians from any responsibility for the consequences of my refusal.    o  Refuse   5. I authorize the use of any specimen, organs, tissues, body parts or foreign objects that may be removed from my body during the operation/procedure for diagnosis, research or teaching purposes and their subsequent disposal by hospital authorities. I also authorize the release of specimen test results and/or written reports to my treating physician on the hospital medical staff or other referring or consulting physicians involved in my care, at the discretion of the Pathologist or my treating physician. 6.   I consent to the photographing or videotaping of the operations or procedures to be performed, including appropriate portions of my body for medical, scientific, or educational purposes, provided my identity is not revealed by the pictures or by descriptive texts accompanying them. If the procedure has been photographed/videotaped, the surgeon will obtain the original picture, image, videotape or CD. The hospital will not be responsible for storage, release or maintenance of the picture, image, tape or CD.    7.   I consent to the presence of a  or observers in the operating room as deemed necessary by my physician or their designees.     8.   I recognize that in the event my procedure results in extended X-Ray/fluoroscopy time, I may develop a skin reaction. 9. If I have a Do Not Attempt Resuscitation (DNAR) order in place, that status will be suspended while in the operating room, procedural suite, and during the recovery period unless otherwise explicitly stated by me (or a person authorized to consent on my behalf). The surgeon or my attending physician will determine when the applicable recovery period ends for purposes of reinstating the DNAR order. 10. Patients having a sterilization procedure: I understand that if the procedure is successful the results will be permanent and it will therefore be impossible for me to inseminate, conceive, or bear children. I also understand that the procedure is intended to result in sterility, although the result has not been guaranteed. 11. I acknowledge that my physician has explained sedation/analgesia administration to me including the risk and benefits I consent to the administration of sedation/analgesia as may be necessary or desirable in the judgment of my physician. I CERTIFY THAT I HAVE READ AND FULLY UNDERSTAND THE ABOVE CONSENT TO OPERATION and/or OTHER PROCEDURE.     _________________________________________ _________________________________     ___________________________________  Signature of Patient     Signature of Responsible Person                   Printed Name of Responsible Person                              _________________________________________ ______________________________        ___________________________________  Signature of Witness         Date  Time         Relationship to Patient    STATEMENT OF PHYSICIAN My signature below affirms that prior to the time of the procedure; I have explained to the patient and/or his/her legal representative, the risks and benefits involved in the proposed treatment and any reasonable alternative to the proposed treatment.  I have also explained the risks and benefits involved in refusal of the proposed treatment and alternatives to the proposed treatment and have answered the patient's questions.  If I have a significant financial interest in a co-management agreement or a significant financial interest in any product or implant, or other significant relationship used in this procedure/surgery, I have disclosed this and had a discussion with my patient.     _______________________________________________________________ _____________________________  (Signature of Physician)                                                                                         (Date)                                   (Time)  Patient Name: Machelle Rodney    : 1975   Printed: 2023      Medical Record #: [id number]                                              Page 1 of 1